# Patient Record
Sex: MALE | Race: WHITE | NOT HISPANIC OR LATINO
[De-identification: names, ages, dates, MRNs, and addresses within clinical notes are randomized per-mention and may not be internally consistent; named-entity substitution may affect disease eponyms.]

---

## 2019-10-22 ENCOUNTER — APPOINTMENT (OUTPATIENT)
Dept: GASTROENTEROLOGY | Facility: CLINIC | Age: 79
End: 2019-10-22
Payer: MEDICARE

## 2019-10-22 VITALS
TEMPERATURE: 97.9 F | HEIGHT: 65 IN | DIASTOLIC BLOOD PRESSURE: 78 MMHG | WEIGHT: 166 LBS | OXYGEN SATURATION: 98 % | BODY MASS INDEX: 27.66 KG/M2 | RESPIRATION RATE: 17 BRPM | SYSTOLIC BLOOD PRESSURE: 122 MMHG | HEART RATE: 83 BPM

## 2019-10-22 DIAGNOSIS — K92.1 MELENA: ICD-10-CM

## 2019-10-22 DIAGNOSIS — Z12.11 ENCOUNTER FOR SCREENING FOR MALIGNANT NEOPLASM OF COLON: ICD-10-CM

## 2019-10-22 PROCEDURE — 99204 OFFICE O/P NEW MOD 45 MIN: CPT

## 2019-10-22 RX ORDER — POLYETHYLENE GLYCOL 3350, SODIUM SULFATE, SODIUM CHLORIDE, POTASSIUM CHLORIDE, ASCORBIC ACID, SODIUM ASCORBATE 7.5-2.691G
100 KIT ORAL
Qty: 1 | Refills: 0 | Status: ACTIVE | COMMUNITY
Start: 2019-10-22 | End: 1900-01-01

## 2019-10-22 NOTE — ASSESSMENT
[FreeTextEntry1] : Impression:\par \par #1 hematochezia-self-limited hematochezia with history suggestive of a distal outlet anorectal etiology likely from hemorrhoids. Evaluate for any alternate etiology including any possibility of a colonic neoplastic process.\par \par #2 sigmoid diverticulosis.\par \par #3 hemorrhoids.\par \par #4 status post open cholecystectomy.\par \par #5 status appendectomy-history of ruptured appendicitis.\par \par General Medical:\par \par -Hypertension.\par \par -Valvular heart disease: Status post AVR. History rheumatic fever.\par \par -History atrial fibrillation.\par \par -Bladder cancer status post cystoscopic excision.\par \par -Psoriasis.

## 2019-10-22 NOTE — HISTORY OF PRESENT ILLNESS
[FreeTextEntry1] : Office consultation on 10/22/19.\par \par The patient is a 79-year-old man evaluated for consultation following a recent episode of self-limited hematochezia.\par \par Reports that approximately 2-3 weeks ago he had 2 episodes of scant hematochezia. He experiences a sense of anal irritation. He passed an otherwise normal formed bowel movement and observed scant bright red blood coating the stool. This happened on 2 occasions and has subsequently resolved.\par \par Patient now has 2-3 formed bowel movements daily without any complaints of diarrhea, constipation or anal pain. Rectal bleeding has not recurred.\par \par Appetite and weight are stable. Denies any complaints of dysphagia, heartburn, nausea, vomiting or abdominal pain.\par \par Colonoscopy was performed on 4/14/14 which was noted for hemorrhoids. Sigmoid diverticulosis was detected. There was mild patchy peridiverticular erythema. The colonoscopy examination was otherwise normal.\par \par The patient previously underwent an open cholecystectomy. He had a ruptured appendicitis at age 15. Patient otherwise reports no past history of digestive illness except as noted. Family history of colon cancer is not reported.

## 2019-10-22 NOTE — PHYSICAL EXAM
[General Appearance - Alert] : alert [General Appearance - Well Nourished] : well nourished [General Appearance - In No Acute Distress] : in no acute distress [General Appearance - Well Developed] : well developed [General Appearance - Well-Appearing] : healthy appearing [Sclera] : the sclera and conjunctiva were normal [Oropharynx] : the oropharynx was normal [Neck Appearance] : the appearance of the neck was normal [Neck Cervical Mass (___cm)] : no neck mass was observed [Respiration, Rhythm And Depth] : normal respiratory rhythm and effort [Exaggerated Use Of Accessory Muscles For Inspiration] : no accessory muscle use [Auscultation Breath Sounds / Voice Sounds] : lungs were clear to auscultation bilaterally [Heart Rate And Rhythm] : heart rate was normal and rhythm regular [Heart Sounds Gallop] : no gallops [Heart Sounds] : normal S1 and S2 [Murmurs] : no murmurs [Heart Sounds Pericardial Friction Rub] : no pericardial rub [Edema] : there was no peripheral edema [Bowel Sounds] : normal bowel sounds [Abdomen Soft] : soft [] : no hepato-splenomegaly [Abdomen Tenderness] : non-tender [Abdomen Mass (___ Cm)] : no abdominal mass palpated [Abdomen Hernia] : no hernia was discovered [Normal Sphincter Tone] : normal sphincter tone [No Rectal Mass] : no rectal mass [Cervical Lymph Nodes Enlarged Posterior Bilaterally] : posterior cervical [Supraclavicular Lymph Nodes Enlarged Bilaterally] : supraclavicular [Cervical Lymph Nodes Enlarged Anterior Bilaterally] : anterior cervical [No CVA Tenderness] : no ~M costovertebral angle tenderness [Abnormal Walk] : normal gait [Nail Clubbing] : no clubbing  or cyanosis of the fingernails [Skin Color & Pigmentation] : normal skin color and pigmentation [Oriented To Time, Place, And Person] : oriented to person, place, and time [Impaired Insight] : insight and judgment were intact [Affect] : the affect was normal [Mood] : the mood was normal [FreeTextEntry1] : Scattered psoriatic plaques-noted on buttocks.

## 2019-12-30 ENCOUNTER — APPOINTMENT (OUTPATIENT)
Dept: GASTROENTEROLOGY | Facility: HOSPITAL | Age: 79
End: 2019-12-30

## 2020-04-09 ENCOUNTER — APPOINTMENT (OUTPATIENT)
Dept: GASTROENTEROLOGY | Facility: HOSPITAL | Age: 80
End: 2020-04-09

## 2020-07-09 DIAGNOSIS — Z01.818 ENCOUNTER FOR OTHER PREPROCEDURAL EXAMINATION: ICD-10-CM

## 2020-07-10 ENCOUNTER — APPOINTMENT (OUTPATIENT)
Dept: DISASTER EMERGENCY | Facility: CLINIC | Age: 80
End: 2020-07-10

## 2020-07-10 LAB — SARS-COV-2 N GENE NPH QL NAA+PROBE: NOT DETECTED

## 2020-07-13 ENCOUNTER — OUTPATIENT (OUTPATIENT)
Dept: OUTPATIENT SERVICES | Facility: HOSPITAL | Age: 80
LOS: 1 days | Discharge: HOME | End: 2020-07-13
Payer: MEDICARE

## 2020-07-13 ENCOUNTER — APPOINTMENT (OUTPATIENT)
Dept: GASTROENTEROLOGY | Facility: HOSPITAL | Age: 80
End: 2020-07-13

## 2020-07-13 ENCOUNTER — RESULT REVIEW (OUTPATIENT)
Age: 80
End: 2020-07-13

## 2020-07-13 VITALS
HEIGHT: 65 IN | DIASTOLIC BLOOD PRESSURE: 77 MMHG | SYSTOLIC BLOOD PRESSURE: 128 MMHG | TEMPERATURE: 98 F | HEART RATE: 73 BPM | OXYGEN SATURATION: 99 % | RESPIRATION RATE: 16 BRPM | WEIGHT: 166.89 LBS

## 2020-07-13 VITALS
RESPIRATION RATE: 18 BRPM | DIASTOLIC BLOOD PRESSURE: 78 MMHG | OXYGEN SATURATION: 98 % | SYSTOLIC BLOOD PRESSURE: 121 MMHG | HEART RATE: 67 BPM

## 2020-07-13 DIAGNOSIS — Z12.11 ENCOUNTER FOR SCREENING FOR MALIGNANT NEOPLASM OF COLON: ICD-10-CM

## 2020-07-13 PROCEDURE — 45380 COLONOSCOPY AND BIOPSY: CPT

## 2020-07-13 PROCEDURE — 88305 TISSUE EXAM BY PATHOLOGIST: CPT | Mod: 26

## 2020-07-13 NOTE — ASU PATIENT PROFILE, ADULT - PMH
Atrial fibrillation, unspecified type    HTN (hypertension)    Rheumatic fever  in childhood  Severe aortic stenosis

## 2020-07-15 LAB — SURGICAL PATHOLOGY STUDY: SIGNIFICANT CHANGE UP

## 2020-12-21 PROBLEM — Z12.11 ENCOUNTER FOR SCREENING COLONOSCOPY: Status: RESOLVED | Noted: 2019-10-22 | Resolved: 2020-12-21

## 2021-09-01 ENCOUNTER — OUTPATIENT (OUTPATIENT)
Dept: OUTPATIENT SERVICES | Facility: HOSPITAL | Age: 81
LOS: 1 days | End: 2021-09-01
Payer: MEDICARE

## 2021-09-10 ENCOUNTER — INPATIENT (INPATIENT)
Facility: HOSPITAL | Age: 81
LOS: 5 days | Discharge: HOME HEALTH | DRG: 862 | End: 2021-09-16
Attending: INTERNAL MEDICINE | Admitting: INTERNAL MEDICINE
Payer: COMMERCIAL

## 2021-09-10 VITALS
SYSTOLIC BLOOD PRESSURE: 110 MMHG | DIASTOLIC BLOOD PRESSURE: 70 MMHG | HEIGHT: 65 IN | OXYGEN SATURATION: 99 % | TEMPERATURE: 98 F | RESPIRATION RATE: 18 BRPM | WEIGHT: 154.98 LBS | HEART RATE: 74 BPM

## 2021-09-10 DIAGNOSIS — I10 ESSENTIAL (PRIMARY) HYPERTENSION: ICD-10-CM

## 2021-09-10 DIAGNOSIS — T14.8XXA OTHER INJURY OF UNSPECIFIED BODY REGION, INITIAL ENCOUNTER: ICD-10-CM

## 2021-09-10 DIAGNOSIS — C68.9 MALIGNANT NEOPLASM OF URINARY ORGAN, UNSPECIFIED: ICD-10-CM

## 2021-09-10 DIAGNOSIS — Z90.79 ACQUIRED ABSENCE OF OTHER GENITAL ORGAN(S): Chronic | ICD-10-CM

## 2021-09-10 DIAGNOSIS — N39.0 URINARY TRACT INFECTION, SITE NOT SPECIFIED: ICD-10-CM

## 2021-09-10 DIAGNOSIS — I48.20 CHRONIC ATRIAL FIBRILLATION, UNSPECIFIED: ICD-10-CM

## 2021-09-10 DIAGNOSIS — T81.19XA OTHER POSTPROCEDURAL SHOCK, INITIAL ENCOUNTER: ICD-10-CM

## 2021-09-10 DIAGNOSIS — Z29.9 ENCOUNTER FOR PROPHYLACTIC MEASURES, UNSPECIFIED: ICD-10-CM

## 2021-09-10 DIAGNOSIS — R18.8 OTHER ASCITES: ICD-10-CM

## 2021-09-10 LAB
ALBUMIN SERPL ELPH-MCNC: 3.4 G/DL — SIGNIFICANT CHANGE UP (ref 3.3–5)
ALP SERPL-CCNC: 109 U/L — SIGNIFICANT CHANGE UP (ref 40–120)
ALT FLD-CCNC: 17 U/L — SIGNIFICANT CHANGE UP (ref 10–45)
ANION GAP SERPL CALC-SCNC: 13 MMOL/L — SIGNIFICANT CHANGE UP (ref 5–17)
APPEARANCE UR: CLEAR — SIGNIFICANT CHANGE UP
APTT BLD: 39.9 SEC — HIGH (ref 27.5–35.5)
AST SERPL-CCNC: 11 U/L — SIGNIFICANT CHANGE UP (ref 10–40)
BACTERIA # UR AUTO: ABNORMAL
BASE EXCESS BLDV CALC-SCNC: 0.9 MMOL/L — SIGNIFICANT CHANGE UP (ref -2–2)
BASOPHILS # BLD AUTO: 0.01 K/UL — SIGNIFICANT CHANGE UP (ref 0–0.2)
BASOPHILS NFR BLD AUTO: 0.1 % — SIGNIFICANT CHANGE UP (ref 0–2)
BILIRUB SERPL-MCNC: 0.4 MG/DL — SIGNIFICANT CHANGE UP (ref 0.2–1.2)
BILIRUB UR-MCNC: NEGATIVE — SIGNIFICANT CHANGE UP
BLD GP AB SCN SERPL QL: NEGATIVE — SIGNIFICANT CHANGE UP
BUN SERPL-MCNC: 24 MG/DL — HIGH (ref 7–23)
CA-I SERPL-SCNC: 1.27 MMOL/L — SIGNIFICANT CHANGE UP (ref 1.15–1.33)
CALCIUM SERPL-MCNC: 9.4 MG/DL — SIGNIFICANT CHANGE UP (ref 8.4–10.5)
CHLORIDE BLDV-SCNC: 101 MMOL/L — SIGNIFICANT CHANGE UP (ref 96–108)
CHLORIDE SERPL-SCNC: 98 MMOL/L — SIGNIFICANT CHANGE UP (ref 96–108)
CO2 BLDV-SCNC: 28 MMOL/L — HIGH (ref 22–26)
CO2 SERPL-SCNC: 24 MMOL/L — SIGNIFICANT CHANGE UP (ref 22–31)
COLOR SPEC: SIGNIFICANT CHANGE UP
CREAT SERPL-MCNC: 0.93 MG/DL — SIGNIFICANT CHANGE UP (ref 0.5–1.3)
DIFF PNL FLD: NEGATIVE — SIGNIFICANT CHANGE UP
EOSINOPHIL # BLD AUTO: 0.08 K/UL — SIGNIFICANT CHANGE UP (ref 0–0.5)
EOSINOPHIL NFR BLD AUTO: 0.6 % — SIGNIFICANT CHANGE UP (ref 0–6)
EPI CELLS # UR: 0 /HPF — SIGNIFICANT CHANGE UP
GAS PNL BLDV: 135 MMOL/L — LOW (ref 136–145)
GAS PNL BLDV: SIGNIFICANT CHANGE UP
GLUCOSE BLDV-MCNC: 137 MG/DL — HIGH (ref 70–99)
GLUCOSE SERPL-MCNC: 136 MG/DL — HIGH (ref 70–99)
GLUCOSE UR QL: NEGATIVE — SIGNIFICANT CHANGE UP
HCO3 BLDV-SCNC: 27 MMOL/L — SIGNIFICANT CHANGE UP (ref 22–29)
HCT VFR BLD CALC: 27.6 % — LOW (ref 39–50)
HCT VFR BLDA CALC: 26 % — LOW (ref 39–51)
HGB BLD CALC-MCNC: 8.7 G/DL — LOW (ref 12.6–17.4)
HGB BLD-MCNC: 8.3 G/DL — LOW (ref 13–17)
HYALINE CASTS # UR AUTO: 6 /LPF — HIGH (ref 0–2)
IMM GRANULOCYTES NFR BLD AUTO: 0.9 % — SIGNIFICANT CHANGE UP (ref 0–1.5)
INR BLD: 2.14 RATIO — HIGH (ref 0.88–1.16)
KETONES UR-MCNC: NEGATIVE — SIGNIFICANT CHANGE UP
LACTATE BLDV-MCNC: 2.2 MMOL/L — HIGH (ref 0.7–2)
LEUKOCYTE ESTERASE UR-ACNC: ABNORMAL
LYMPHOCYTES # BLD AUTO: 1.23 K/UL — SIGNIFICANT CHANGE UP (ref 1–3.3)
LYMPHOCYTES # BLD AUTO: 9.6 % — LOW (ref 13–44)
MCHC RBC-ENTMCNC: 23.7 PG — LOW (ref 27–34)
MCHC RBC-ENTMCNC: 30.1 GM/DL — LOW (ref 32–36)
MCV RBC AUTO: 78.9 FL — LOW (ref 80–100)
MONOCYTES # BLD AUTO: 0.86 K/UL — SIGNIFICANT CHANGE UP (ref 0–0.9)
MONOCYTES NFR BLD AUTO: 6.7 % — SIGNIFICANT CHANGE UP (ref 2–14)
NEUTROPHILS # BLD AUTO: 10.51 K/UL — HIGH (ref 1.8–7.4)
NEUTROPHILS NFR BLD AUTO: 82.1 % — HIGH (ref 43–77)
NITRITE UR-MCNC: POSITIVE
NRBC # BLD: 0 /100 WBCS — SIGNIFICANT CHANGE UP (ref 0–0)
PCO2 BLDV: 47 MMHG — SIGNIFICANT CHANGE UP (ref 42–55)
PH BLDV: 7.36 — SIGNIFICANT CHANGE UP (ref 7.32–7.43)
PH UR: 6.5 — SIGNIFICANT CHANGE UP (ref 5–8)
PLATELET # BLD AUTO: 320 K/UL — SIGNIFICANT CHANGE UP (ref 150–400)
PO2 BLDV: 23 MMHG — LOW (ref 25–45)
POTASSIUM BLDV-SCNC: 3.6 MMOL/L — SIGNIFICANT CHANGE UP (ref 3.5–5.1)
POTASSIUM SERPL-MCNC: 3.4 MMOL/L — LOW (ref 3.5–5.3)
POTASSIUM SERPL-SCNC: 3.4 MMOL/L — LOW (ref 3.5–5.3)
PROT SERPL-MCNC: 6.8 G/DL — SIGNIFICANT CHANGE UP (ref 6–8.3)
PROT UR-MCNC: SIGNIFICANT CHANGE UP
PROTHROM AB SERPL-ACNC: 24.8 SEC — HIGH (ref 10.6–13.6)
RAPID RVP RESULT: SIGNIFICANT CHANGE UP
RBC # BLD: 3.5 M/UL — LOW (ref 4.2–5.8)
RBC # FLD: 19.3 % — HIGH (ref 10.3–14.5)
RBC CASTS # UR COMP ASSIST: 3 /HPF — SIGNIFICANT CHANGE UP (ref 0–4)
RH IG SCN BLD-IMP: POSITIVE — SIGNIFICANT CHANGE UP
SAO2 % BLDV: 19.8 % — LOW (ref 67–88)
SARS-COV-2 RNA SPEC QL NAA+PROBE: SIGNIFICANT CHANGE UP
SODIUM SERPL-SCNC: 135 MMOL/L — SIGNIFICANT CHANGE UP (ref 135–145)
SP GR SPEC: 1.01 — SIGNIFICANT CHANGE UP (ref 1.01–1.02)
UROBILINOGEN FLD QL: NEGATIVE — SIGNIFICANT CHANGE UP
WBC # BLD: 12.8 K/UL — HIGH (ref 3.8–10.5)
WBC # FLD AUTO: 12.8 K/UL — HIGH (ref 3.8–10.5)
WBC UR QL: 57 /HPF — HIGH (ref 0–5)

## 2021-09-10 PROCEDURE — 74177 CT ABD & PELVIS W/CONTRAST: CPT | Mod: 26,MA

## 2021-09-10 PROCEDURE — 99223 1ST HOSP IP/OBS HIGH 75: CPT

## 2021-09-10 PROCEDURE — 99285 EMERGENCY DEPT VISIT HI MDM: CPT

## 2021-09-10 RX ORDER — DILTIAZEM HCL 120 MG
30 CAPSULE, EXT RELEASE 24 HR ORAL THREE TIMES A DAY
Refills: 0 | Status: DISCONTINUED | OUTPATIENT
Start: 2021-09-10 | End: 2021-09-16

## 2021-09-10 RX ORDER — PIPERACILLIN AND TAZOBACTAM 4; .5 G/20ML; G/20ML
3.38 INJECTION, POWDER, LYOPHILIZED, FOR SOLUTION INTRAVENOUS ONCE
Refills: 0 | Status: COMPLETED | OUTPATIENT
Start: 2021-09-10 | End: 2021-09-10

## 2021-09-10 RX ORDER — LANOLIN ALCOHOL/MO/W.PET/CERES
3 CREAM (GRAM) TOPICAL AT BEDTIME
Refills: 0 | Status: DISCONTINUED | OUTPATIENT
Start: 2021-09-10 | End: 2021-09-16

## 2021-09-10 RX ORDER — METOPROLOL TARTRATE 50 MG
100 TABLET ORAL DAILY
Refills: 0 | Status: DISCONTINUED | OUTPATIENT
Start: 2021-09-10 | End: 2021-09-16

## 2021-09-10 RX ORDER — PIPERACILLIN AND TAZOBACTAM 4; .5 G/20ML; G/20ML
3.38 INJECTION, POWDER, LYOPHILIZED, FOR SOLUTION INTRAVENOUS EVERY 8 HOURS
Refills: 0 | Status: DISCONTINUED | OUTPATIENT
Start: 2021-09-10 | End: 2021-09-16

## 2021-09-10 RX ORDER — VANCOMYCIN HCL 1 G
1000 VIAL (EA) INTRAVENOUS ONCE
Refills: 0 | Status: COMPLETED | OUTPATIENT
Start: 2021-09-10 | End: 2021-09-10

## 2021-09-10 RX ORDER — ACETAMINOPHEN 500 MG
650 TABLET ORAL EVERY 6 HOURS
Refills: 0 | Status: DISCONTINUED | OUTPATIENT
Start: 2021-09-10 | End: 2021-09-16

## 2021-09-10 RX ADMIN — PIPERACILLIN AND TAZOBACTAM 3.38 GRAM(S): 4; .5 INJECTION, POWDER, LYOPHILIZED, FOR SOLUTION INTRAVENOUS at 18:18

## 2021-09-10 RX ADMIN — Medication 250 MILLIGRAM(S): at 18:18

## 2021-09-10 RX ADMIN — PIPERACILLIN AND TAZOBACTAM 200 GRAM(S): 4; .5 INJECTION, POWDER, LYOPHILIZED, FOR SOLUTION INTRAVENOUS at 17:33

## 2021-09-10 NOTE — H&P ADULT - ASSESSMENT
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 History was provided by the patient and his son, Curry at bedside. The patient reports he had been recovering from his surgery, but still felt fatigued. He noticed drainage of pus, redness and swelling at the pelvic incision site and reported to his doctor earlier today at [facility] during his scheduled follow-up visit, and was thus sent to the ED.  The patient denies fever, chills, nausea, vomiting. He gets constipation. Urine is clear yellow and nonbloody. He notes some odor when urine accumulates. He has not had dizziness or lightheadedness.  Of note, patient endorses having mild chronic SOB since his immunotherapy treatment. He has chronic pedal edema since the surgery which has been improving. He had US to rule out DVT.   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

## 2021-09-10 NOTE — ED ADULT NURSE NOTE - NSIMPLEMENTINTERV_GEN_ALL_ED
Implemented All Fall with Harm Risk Interventions:  Bear to call system. Call bell, personal items and telephone within reach. Instruct patient to call for assistance. Room bathroom lighting operational. Non-slip footwear when patient is off stretcher. Physically safe environment: no spills, clutter or unnecessary equipment. Stretcher in lowest position, wheels locked, appropriate side rails in place. Provide visual cue, wrist band, yellow gown, etc. Monitor gait and stability. Monitor for mental status changes and reorient to person, place, and time. Review medications for side effects contributing to fall risk. Reinforce activity limits and safety measures with patient and family. Provide visual clues: red socks.

## 2021-09-10 NOTE — CONSULT NOTE ADULT - ASSESSMENT
Hematology/Oncology consulted on this 80 year old gentleman with history of high grade muscle invasive urothelial carcinoma, on IRB 18-42 protocol at [facility]. Patient received Nivo/ipi 5/25 and nivolumab on 6/15/21 per protocol. Immunotherapy was discontinued on IRB due to grade 3 autoimmune hepatitis. He is cisplatin ineligible due to hearing loss. He had a prednisone taper and is now off steroids. His repeat C/A/P with contrast on 7/8/21 showed mild right hydronephrosis. He is S/P robotic cystectomy, ( last month) prostatectomy with enterostomy by Dr Swann on 7/30/21. He had an appointment today with [facility] for toxicity check and hydration. He did not receive IV hydration today and was referred for evaluation of a infected suprapubic incision left side, with purulent drainage. He denies pain and fever.  He is currently undergoing treatment at [facility] with Dr Smith.   Of note, history of AVR/afib on xarelto and HTN.      High grade urothelial bladder cancer  --Undergoing treatment at [facility]  --will follow up with Dr Smith at [facility] after discharge    Infected wound  --WBC 12.8, Afebrile  --CTAP  pending to rule out abscess  --blood and tissue cultures pending        Anemia  --H&H 8.3/27.6, likely related to underlying malignancy  --will trend curve    UTI  --many bacteria. large leukocytes  --Culture pending  --zosyn/vanco given in ED    Bilateral lower extremity edema  --Can diurese when labs result  --per primary      Patient will be seen daily while admitted and we will continue to coordinate with [facility]    Sobia Beyer NP  Hematology/Oncology  [facility]  [phone] (Cell)  [phone] (Office)  [phone] (Alt office)  Evenings and weekends please call MD on call or office         Hematology/Oncology consulted on this 80 year old gentleman with history of high grade muscle invasive urothelial carcinoma, on IRB 18-42 protocol at [facility]. Patient received Nivo/ipi 5/25 and nivolumab on 6/15/21 per protocol. Immunotherapy was discontinued on IRB due to grade 3 autoimmune hepatitis. He is cisplatin ineligible due to hearing loss. He had a prednisone taper and is now off steroids. His repeat C/A/P with contrast on 7/8/21 showed mild right hydronephrosis. He is S/P robotic cystectomy, ( last month) prostatectomy with enterostomy by Dr Swann on 7/30/21. He had an appointment today with [facility] for toxicity check and hydration. He did not receive IV hydration today and was referred for evaluation of a infected suprapubic incision left side, with purulent drainage. He denies pain and fever.  He is currently undergoing treatment at [facility] with Dr Smith.   Of note, history of AVR/afib on xarelto and HTN.      High grade urothelial bladder cancer  --Undergoing treatment at [facility]  --will follow up with Dr Smith at [facility] after discharge    Infected wound  --WBC 12.8, Afebrile  --CTAP  pending to rule out abscess  --blood and tissue cultures pending        Anemia  --H&H 8.3/27.6, likely related to underlying malignancy  --will trend curve  --please transfuse for hgb <7.0    UTI  --many bacteria. large leukocytes  --Culture pending  --zosyn/vanco given in ED    Bilateral lower extremity edema  --Can diurese when labs result  --per primary      Patient will be seen daily while admitted and we will continue to coordinate with [facility]    Sobia Beyer NP  Hematology/Oncology  [facility]  [phone] (Cell)  [phone] (Office)  [phone] (Alt office)  Evenings and weekends please call MD on call or office

## 2021-09-10 NOTE — H&P ADULT - NSICDXPASTSURGICALHX_GEN_ALL_CORE_FT
PAST SURGICAL HISTORY:  History of appendectomy     History of cholecystectomy     History of nasal septoplasty     S/P prostatectomy

## 2021-09-10 NOTE — H&P ADULT - NSHPSOCIALHISTORY_GEN_ALL_CORE
The patient denies tobacco use, alcohol use or drug use.  He is . The patient denies tobacco use, alcohol use or drug use.  He is  and lives with his 2 sons.

## 2021-09-10 NOTE — ED PROVIDER NOTE - NSICDXPASTMEDICALHX_GEN_ALL_CORE_FT
PAST MEDICAL HISTORY:  Atrial fibrillation, unspecified type     HTN (hypertension)     Rheumatic fever in childhood    Severe aortic stenosis

## 2021-09-10 NOTE — ED ADULT NURSE NOTE - CHIEF COMPLAINT QUOTE
sent from [facility] for eval of abdominal wound infection and pulmonary edema. pt offers no complaints.

## 2021-09-10 NOTE — H&P ADULT - PROBLEM SELECTOR PLAN 1
Pt has mild purulence, erythema and swelling at pelvic incision site.  Will consult surgical team.   Check MRSA swab. Patient was given vancomycin and zosyn in the ED.  Will continue zosyn for now (also covers UTI). Will continue vancomycin if MRSA positive.   Follow up urine and blood cultures.  Monitor VS q4h. Pt has mild purulence, erythema and swelling at pelvic incision site.  Will consult urology team.   Check MRSA swab. Patient was given vancomycin and zosyn in the ED.  Will continue zosyn for now (also covers UTI). Will continue vancomycin if MRSA positive.   Follow up urine and blood cultures.  Monitor VS q4h.

## 2021-09-10 NOTE — H&P ADULT - NSICDXPASTMEDICALHX_GEN_ALL_CORE_FT
PAST MEDICAL HISTORY:  Atrial fibrillation, unspecified type     HTN (hypertension)     Rheumatic fever in childhood    Severe aortic stenosis     Urothelial cancer

## 2021-09-10 NOTE — ED PROVIDER NOTE - OBJECTIVE STATEMENT
Albaro Christian): 82 y/o M w/ PMH Atrial fibrillation, HTN, rheumatic fever in childhood, severe aortic stenosis, h/o appendectomy, h/l cholecystectomy and h/o nasal septoplasty, coming in from outpatient clinic for concerns of infected wound scar. Pt states he has noted leakage in abdominal wound for 2-3 days now. Denies any pain, fever, chills, nausea or vomiting. Pt has been in normal state of health otherwise.

## 2021-09-10 NOTE — H&P ADULT - NSHPADDITIONALINFOADULT_GEN_ALL_CORE
Patient assigned to me by night hospitalist in charge for management and care for patient for this evening only. Care to be continued by day hospitalist in the morning and thereafter.  Loly Trujillo MD [phone]

## 2021-09-10 NOTE — ED PROVIDER NOTE - PROGRESS NOTE DETAILS
Alvina Morrison (DO) MDM: 80 y/o M w/ PMH bladder CA w/ a cystectomy and ileal conduit July 30, coming in today from doctors office for concern of surgical site infection. His suprapubic incision site is erythematous and indurated w/ some weeping fluid and his ileal conduit site is also erythematous w/ purulent crusting and clear urine. Denies fever, nausea, vomiting or abdominal pain. Will send sepsis labs, CT abd/pelvis to evaluate for collection abscess, culture of incision site sent as well, empiric antibiotics and admit. Alvina Morrison (DO) MDM: 80 y/o M w/ PMH bladder CA w/ a cystectomy and ileal conduit July 30, coming in today from doctors office for concern of surgical site infection. His suprapubic incision site is erythematous and indurated w/ some weeping fluid and his ileal conduit site is also erythematous w/ purulent crusting and clear, non cloudy urine. Denies fever, nausea, vomiting or abdominal pain. Will send sepsis labs, CT abd/pelvis to evaluate for collection abscess, culture of incision site sent as well, empiric antibiotics and admit.

## 2021-09-10 NOTE — H&P ADULT - HISTORY OF PRESENT ILLNESS
This patient is a 80yo gentleman with PMH of atrial fibrillation, htn, rheumatic fever in childhood, severe AS, high grade muscle invasive urothelial cancer s/p immunotherapy c/b  autoimmune hepatitis, s/p robotic cystectomy, prostatectomy with enterostomy who presents to the ED from [facility] for wound infection. 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 History was provided by the patient and his son, Curry at bedside. The patient reports he had been recovering from his surgery, but still felt fatigued. He noticed drainage of pus, redness and swelling at the pelvic incision site and reported to his doctor earlier today at [facility] during his scheduled follow-up visit, and was thus sent to the ED.  The patient denies fever, chills, nausea, vomiting. He gets constipation. Urine is clear yellow and nonbloody. He notes some odor when urine accumulates. He has not had dizziness or lightheadedness.  Of note, patient endorses having mild chronic SOB since his immunotherapy treatment. He has chronic pedal edema since the surgery which has been improving. He had US to rule out DVT.

## 2021-09-10 NOTE — H&P ADULT - NSHPREVIEWOFSYSTEMS_GEN_ALL_CORE
REVIEW OF SYSTEMS  CONSTITUTIONAL: No fever, no chills, no fatigue  EYES: No eye pain, no vision changes  ENMT:  No difficulty hearing, no throat pain  RESPIRATORY: No cough, no wheezing, no sputum production; No shortness of breath  CARDIOVASCULAR: No chest pain, no palpitations, no MONTOYA, no leg swelling  GASTROINTESTINAL: No abdominal pain, no nausea, no vomiting, no hematemesis, no diarrhea, no constipation, no melena, no hematochezia.  GENITOURINARY: No dysuria, no hematuria  NEUROLOGICAL: No headaches, no loss of strength, no numbness  SKIN: No itching, no rashes, no lesions   MUSCULOSKELETAL: No joint pain, no joint swelling; No muscle pain  HEME/LYMPH: No easy bruising, bleeding REVIEW OF SYSTEMS  CONSTITUTIONAL: No fever, no chills, + fatigue  EYES: No eye pain, no vision changes  ENMT:  + difficulty hearing, no throat pain  RESPIRATORY: No cough, no wheezing, no sputum production; + mild chronic shortness of breath  CARDIOVASCULAR: No chest pain, no palpitations, + leg swelling  GASTROINTESTINAL: no nausea, no vomiting, no diarrhea, + constipation, occasional blood noted when wiping after straining  GENITOURINARY: no hematuria, + urine odor  NEUROLOGICAL: No headaches, no loss of strength, no numbness  SKIN: No itching, no rashes, no lesions   MUSCULOSKELETAL: No joint pain, no joint swelling; No muscle pain  HEME/LYMPH: No easy bruising, no bleeding

## 2021-09-10 NOTE — H&P ADULT - PROBLEM SELECTOR PLAN 5
Patient denies CP or palpitations at this time.   Cardiology progress note from [facility] indicates that the patient has undergone cardioversion x 3 in the past, then reverted back to atrial fibrillation.   He has a CHADSVASC score of 3 based on age and htn.   Maintain  K4, Mg 2 Patient denies CP or palpitations at this time.   Cardiology progress note from [facility] indicates that the patient has undergone cardioversion x 3 in the past, then reverted back to atrial fibrillation.   He has a CHADSVASC score of 3 based on age and htn.   Maintain  K4, Mg 2  Hold for now xarelto pending IR evaluation.  C/w metoprolol and diltiazem.

## 2021-09-10 NOTE — ED CLERICAL - NS ED CLERK NOTE PRE-ARRIVAL INFORMATION; ADDITIONAL PRE-ARRIVAL INFORMATION
CC/Reason For referral: [facility] bladder ca last tx June 2021 - 7/20/21 s/p ileal conduit - suprapubic site oozing urine, otherwise unremarkable r/o obstruction - c/o sob and LE 3+ pitting edema, on Hydralazine   Preferred Consultant(if applicable):  Who admits for you (if needed):  Do you have documents you would like to fax over? NO  Would you still like to speak to an ED attending? NO

## 2021-09-10 NOTE — ED PROVIDER NOTE - NSICDXPASTSURGICALHX_GEN_ALL_CORE_FT
PAST SURGICAL HISTORY:  History of appendectomy     History of cholecystectomy     History of nasal septoplasty

## 2021-09-10 NOTE — ED ADULT NURSE NOTE - OBJECTIVE STATEMENT
81yr old male w/ pmhx of Afib, HTN, Rheumatic fever, and aortic stenosis presents to ED from [facility] c/o possible wound infection, and LE edema. Pt s/p bladder and prostate w/ ostomy removal july 30th, he went to [facility] for check up and was sent to ED because abdominal incision site appears to be infected. Pt also states he is experiencing increasing edema in the lower extremities. Pt states his LE are usually edematous, but it has gotten progressively worse. Pt states he is asymptomatic, he denies fevers, chills, NVD, Flank pain, CP, GI symptoms. Some redness noted around incision site, pt denies any puss or foul odor of site. Pt accompanied by son, placed on CM, bed lowered and locked, call bell within reach will reassess

## 2021-09-10 NOTE — H&P ADULT - PROBLEM SELECTOR PLAN 7
VTE ppx:  hold xarelto pending IR evaluation  Activity: OOB with assistance, fall precautions  Diet: NPO after midnight

## 2021-09-10 NOTE — H&P ADULT - NSHPPHYSICALEXAM_GEN_ALL_CORE
T(C): 36.6 (09-10-21 @ 16:45), Max: 36.6 (09-10-21 @ 15:47)  HR: 74 (09-10-21 @ 20:44) (66 - 74)  BP: 113/64 (09-10-21 @ 20:44) (110/70 - 116/57)  RR: 26 (09-10-21 @ 20:44) (18 - 30)  SpO2: 100% (09-10-21 @ 20:44) (99% - 100%)  Wt(kg): --    PHYSICAL EXAM:  GENERAL: NAD, well-groomed, well-developed  HEAD:  Atraumatic, Normocephalic  EYES: EOMI, PERRLA, conjunctiva and sclera clear  ENMT: No oropharyngeal exudates, erythema or lesions,  Moist mucous membranes, good dentition  NECK: Supple, no cervical lymphadenopathy, no JVD  NERVOUS SYSTEM:  Alert & Oriented X3, CN II-XII intact, 5/5 BUE and BLE motor strength, full sensation to light touch   CHEST/LUNG: Clear to auscultation bilaterally; No rales, no  rhonchi, no wheezing  HEART: Regular rate and rhythm; No murmurs, rubs, or gallops  ABDOMEN: Soft, Nontender, Nondistended  EXTREMITIES:  2+ radial Pulses, No cyanosis or edema  SKIN: warm, dry T(C): 36.6 (09-10-21 @ 16:45), Max: 36.6 (09-10-21 @ 15:47)  HR: 74 (09-10-21 @ 20:44) (66 - 74)  BP: 113/64 (09-10-21 @ 20:44) (110/70 - 116/57)  RR: 26 (09-10-21 @ 20:44) (18 - 30)  SpO2: 100% (09-10-21 @ 20:44) (99% - 100%)  Wt(kg): --    PHYSICAL EXAM:  GENERAL: NAD, well-developed  HEAD:  Atraumatic, Normocephalic  EYES: EOMI, PERRLA, conjunctiva and sclera clear  ENMT: hard of hearing, No oropharyngeal exudates, Moist mucous membranes  NECK: Supple, no cervical lymphadenopathy  NERVOUS SYSTEM:  Alert & Oriented X3, conversant, ambulatory, CN II-XII intact, 5/5 BUE motor strength, full sensation to light touch   CHEST/LUNG: Clear to auscultation bilaterally; No rales, no  rhonchi, no wheezing  HEART: Regular rate and rhythm; No murmurs, rubs, or gallops  3+ pitting edema bilateral lower extremity to knees   ABDOMEN: Soft, Nontender, Nondistended  : ileal conduit draining clear yellow urine with white sediment into bag  no surrounding tenderness to exam  EXTREMITIES:  2+ radial Pulses, No cyanosis  SKIN: erythema, warmth and minimal purulent drainage from horizontal pelvic incision site, no blood

## 2021-09-10 NOTE — H&P ADULT - PROBLEM SELECTOR PLAN 2
UA is grossly positive.  Will continue zosyn for now.  Follow up urine and blood cultures.  Monitor VS q4h.  Tylenol PRN for fever.

## 2021-09-10 NOTE — H&P ADULT - NSHPLABSRESULTS_GEN_ALL_CORE
Labs, personally reviewed by me.     CBC found leukocytosis of 12.8 with neutrophilia.  Patient has microcytic anemia with Hgb of 8.3.  INR elevated at 2.14.  Mild hypokalemia K 3.4.  Lactate 2.2  UA grossly positive for bacteria, LE, nitrite.  COVIDRVP negative.    LABS:                        8.3    12.80 )-----------( 320      ( 10 Sep 2021 17:16 )             27.6     Hgb Trend: 8.3<--  09-10    135  |  98  |  24<H>  ----------------------------<  136<H>  3.4<L>   |  24  |  0.93    Ca    9.4      10 Sep 2021 17:16    TPro  6.8  /  Alb  3.4  /  TBili  0.4  /  DBili  x   /  AST  11  /  ALT  17  /  AlkPhos  109  09-10    Creatinine Trend: 0.93<--  PT/INR - ( 10 Sep 2021 17:16 )   PT: 24.8 sec;   INR: 2.14 ratio         PTT - ( 10 Sep 2021 17:16 )  PTT:39.9 sec  Urinalysis Basic - ( 10 Sep 2021 17:16 )    Color: Light Yellow / Appearance: Clear / S.010 / pH: x  Gluc: x / Ketone: Negative  / Bili: Negative / Urobili: Negative   Blood: x / Protein: Trace / Nitrite: Positive   Leuk Esterase: Large / RBC: 3 /hpf / WBC 57 /HPF   Sq Epi: x / Non Sq Epi: 0 /hpf / Bacteria: Many        Venous Blood Gas:  09-10 @ 17:16  7.36/47/23/27/19.8  VBG Lactate: 2.2 Labs, personally reviewed by me.     CBC found leukocytosis of 12.8 with neutrophilia.  Patient has microcytic anemia with Hgb of 8.3.  INR elevated at 2.14.  Mild hypokalemia K 3.4.  Lactate 2.2  UA grossly positive for bacteria, LE, nitrite.  COVID RVP negative.    LABS:                        8.3    12.80 )-----------( 320      ( 10 Sep 2021 17:16 )             27.6     Hgb Trend: 8.3<--  09-10    135  |  98  |  24<H>  ----------------------------<  136<H>  3.4<L>   |  24  |  0.93    Ca    9.4      10 Sep 2021 17:16    TPro  6.8  /  Alb  3.4  /  TBili  0.4  /  DBili  x   /  AST  11  /  ALT  17  /  AlkPhos  109  09-10    Creatinine Trend: 0.93<--  PT/INR - ( 10 Sep 2021 17:16 )   PT: 24.8 sec;   INR: 2.14 ratio         PTT - ( 10 Sep 2021 17:16 )  PTT:39.9 sec  Urinalysis Basic - ( 10 Sep 2021 17:16 )    Color: Light Yellow / Appearance: Clear / S.010 / pH: x  Gluc: x / Ketone: Negative  / Bili: Negative / Urobili: Negative   Blood: x / Protein: Trace / Nitrite: Positive   Leuk Esterase: Large / RBC: 3 /hpf / WBC 57 /HPF   Sq Epi: x / Non Sq Epi: 0 /hpf / Bacteria: Many    Venous Blood Gas:  09-10 @ 17:16              7.36/47/23/27/19.8          VBG Lactate: 2.2    < from: CT Abdomen and Pelvis w/ IV Cont (09.10.21 @ 19:10) >    FINDINGS:  LOWER CHEST: Cardiomegaly. Clear lungs.    LIVER: 3.7 cm right hepatic lobe cyst and additional scattered hypodensities too small characterize.  BILE DUCTS: Normal caliber.  GALLBLADDER: Cholecystectomy.  SPLEEN: Within normal limits.  PANCREAS: Within normal limits.  ADRENALS: Within normal limits.  KIDNEYS/URETERS: Symmetric enhancement. Mild bilateral hydroureteronephrosis. The ureters terminate in an ileal conduit.    BLADDER: Cystectomy.  REPRODUCTIVE ORGANS: Prostate within normal limits.    BOWEL: No bowel obstruction. Appendectomy.  PERITONEUM: Curvilinear rim-enhancing fluid collection along the right psoas and layering in the lower mid pelvic surgical bed. Measures 13.1 cm in the craniocaudal dimension. 7.6cm transverse, and 9.0 cm AP. Extensive inflammatory change at the anterior most aspect of the collection, seemingly adherent to the anterior abdominal wall. A surgical drain adjacent to the ileal conduit terminates in the right hemipelvis just medial to but not within the collection.  VESSELS: Atherosclerotic changes.  RETROPERITONEUM/LYMPH NODES: No lymphadenopathy.  ABDOMINAL WALL: Right lower quadrant ileal conduit. Extensive inflammatory changes in the lower anterior abdominal wall.  BONES: Degenerative changes of the spine. L2 vertebral body hemangioma.    IMPRESSION:  Status post cystectomy and ileal conduit. Curvilinear pelvic fluid collection extending from the cystectomy bed and along the pelvic sidewalls.  There is mild bilateral hydroureteronephrosis.    < end of copied text >    CT A/P results report 2021 from [facility] reviewed-   mildly dilated small bowel without transition point, probable ileus  interval cystoprostatectomy and ileal conduit creation with moderate subcutaneous emphysema- prominent given surgery one week prior  new small bilateral plerual effusions

## 2021-09-10 NOTE — H&P ADULT - PROBLEM SELECTOR PLAN 3
CT revealed a 13.1x 7.6 cm x 9cm curvilinear pelvic fluid collection extending from the cystectomy bed to the pelvic side walls, with extensive inflammatory change at the anterior most aspect of the collection, seemingly adherent to the anterior abdominal wall. Surgical drain is adjacent but not within the collection.   This collection was not noted on his CT report from [facility] from 88/7/2021.  Patient does not have tenderness to palpation of abdomen, nausea, vomiting, fever at this time.  Will consult surgical team, IR team to determine best plan of care for this collection. CT revealed a 13.1x 7.6 cm x 9cm curvilinear pelvic fluid collection extending from the cystectomy bed to the pelvic side walls, with extensive inflammatory change at the anterior most aspect of the collection, seemingly adherent to the anterior abdominal wall. Surgical drain is adjacent but not within the collection.   This collection was not noted on his CT report from [facility] from 88/7/2021.  Patient does not have tenderness to palpation of abdomen, nausea, vomiting, fever at this time.  Will consult urology team and IR team to determine best plan of care for this collection.

## 2021-09-10 NOTE — H&P ADULT - PROBLEM SELECTOR PLAN 4
Follow up oncology team recommendations.  Patient to continue his care with Dr. Smith at [facility].     Anemia- Hgb remains stable from labs from 9/3/2021 from [facility].  Will monitor CBC.

## 2021-09-10 NOTE — ED ADULT TRIAGE NOTE - CHIEF COMPLAINT QUOTE
sent from [facility] for eval of abdominal wound infection and pulmonary edema. pt offers no complaints.

## 2021-09-11 LAB
ANION GAP SERPL CALC-SCNC: 12 MMOL/L — SIGNIFICANT CHANGE UP (ref 5–17)
APTT BLD: 34.4 SEC — SIGNIFICANT CHANGE UP (ref 27.5–35.5)
BUN SERPL-MCNC: 22 MG/DL — SIGNIFICANT CHANGE UP (ref 7–23)
CALCIUM SERPL-MCNC: 9.1 MG/DL — SIGNIFICANT CHANGE UP (ref 8.4–10.5)
CHLORIDE SERPL-SCNC: 101 MMOL/L — SIGNIFICANT CHANGE UP (ref 96–108)
CO2 SERPL-SCNC: 23 MMOL/L — SIGNIFICANT CHANGE UP (ref 22–31)
CREAT SERPL-MCNC: 0.97 MG/DL — SIGNIFICANT CHANGE UP (ref 0.5–1.3)
GLUCOSE SERPL-MCNC: 104 MG/DL — HIGH (ref 70–99)
HCT VFR BLD CALC: 25.5 % — LOW (ref 39–50)
HGB BLD-MCNC: 7.8 G/DL — LOW (ref 13–17)
INR BLD: 1.75 RATIO — HIGH (ref 0.88–1.16)
MCHC RBC-ENTMCNC: 23.7 PG — LOW (ref 27–34)
MCHC RBC-ENTMCNC: 30.6 GM/DL — LOW (ref 32–36)
MCV RBC AUTO: 77.5 FL — LOW (ref 80–100)
MRSA PCR RESULT.: SIGNIFICANT CHANGE UP
NRBC # BLD: 0 /100 WBCS — SIGNIFICANT CHANGE UP (ref 0–0)
PLATELET # BLD AUTO: 272 K/UL — SIGNIFICANT CHANGE UP (ref 150–400)
POTASSIUM SERPL-MCNC: 4.3 MMOL/L — SIGNIFICANT CHANGE UP (ref 3.5–5.3)
POTASSIUM SERPL-SCNC: 4.3 MMOL/L — SIGNIFICANT CHANGE UP (ref 3.5–5.3)
PROTHROM AB SERPL-ACNC: 20.5 SEC — HIGH (ref 10.6–13.6)
RBC # BLD: 3.29 M/UL — LOW (ref 4.2–5.8)
RBC # FLD: 19.1 % — HIGH (ref 10.3–14.5)
S AUREUS DNA NOSE QL NAA+PROBE: SIGNIFICANT CHANGE UP
SODIUM SERPL-SCNC: 136 MMOL/L — SIGNIFICANT CHANGE UP (ref 135–145)
WBC # BLD: 8.96 K/UL — SIGNIFICANT CHANGE UP (ref 3.8–10.5)
WBC # FLD AUTO: 8.96 K/UL — SIGNIFICANT CHANGE UP (ref 3.8–10.5)

## 2021-09-11 PROCEDURE — 99451 NTRPROF PH1/NTRNET/EHR 5/>: CPT

## 2021-09-11 RX ORDER — PHENYLEPHRINE-SHARK LIVER OIL-MINERAL OIL-PETROLATUM RECTAL OINTMENT
1 OINTMENT (GRAM) RECTAL
Refills: 0 | Status: DISCONTINUED | OUTPATIENT
Start: 2021-09-11 | End: 2021-09-16

## 2021-09-11 RX ORDER — INFLUENZA VIRUS VACCINE 15; 15; 15; 15 UG/.5ML; UG/.5ML; UG/.5ML; UG/.5ML
0.5 SUSPENSION INTRAMUSCULAR ONCE
Refills: 0 | Status: DISCONTINUED | OUTPATIENT
Start: 2021-09-11 | End: 2021-09-16

## 2021-09-11 RX ORDER — HEPARIN SODIUM 5000 [USP'U]/ML
5000 INJECTION INTRAVENOUS; SUBCUTANEOUS EVERY 8 HOURS
Refills: 0 | Status: DISCONTINUED | OUTPATIENT
Start: 2021-09-11 | End: 2021-09-13

## 2021-09-11 RX ADMIN — PIPERACILLIN AND TAZOBACTAM 25 GRAM(S): 4; .5 INJECTION, POWDER, LYOPHILIZED, FOR SOLUTION INTRAVENOUS at 17:55

## 2021-09-11 RX ADMIN — PHENYLEPHRINE-SHARK LIVER OIL-MINERAL OIL-PETROLATUM RECTAL OINTMENT 1 APPLICATION(S): at 06:30

## 2021-09-11 RX ADMIN — PIPERACILLIN AND TAZOBACTAM 25 GRAM(S): 4; .5 INJECTION, POWDER, LYOPHILIZED, FOR SOLUTION INTRAVENOUS at 00:52

## 2021-09-11 RX ADMIN — PIPERACILLIN AND TAZOBACTAM 25 GRAM(S): 4; .5 INJECTION, POWDER, LYOPHILIZED, FOR SOLUTION INTRAVENOUS at 09:43

## 2021-09-11 RX ADMIN — Medication 30 MILLIGRAM(S): at 22:04

## 2021-09-11 RX ADMIN — Medication 100 MILLIGRAM(S): at 06:04

## 2021-09-11 RX ADMIN — Medication 30 MILLIGRAM(S): at 06:04

## 2021-09-11 RX ADMIN — HEPARIN SODIUM 5000 UNIT(S): 5000 INJECTION INTRAVENOUS; SUBCUTANEOUS at 22:04

## 2021-09-11 NOTE — PROGRESS NOTE ADULT - SUBJECTIVE AND OBJECTIVE BOX
Name of Patient : RYAN FLOYD  MRN: [id number]  Date of visit: 21      Subjective: Patient seen and examined. No new events except as noted.     REVIEW OF SYSTEMS:    CONSTITUTIONAL: No weakness, fevers or chills  EYES/ENT: No visual changes;  No vertigo or throat pain   NECK: No pain or stiffness  RESPIRATORY: No cough, wheezing, hemoptysis; No shortness of breath  CARDIOVASCULAR: No chest pain or palpitations  GASTROINTESTINAL: No abdominal or epigastric pain.   GENITOURINARY: No dysuria, frequency or hematuria  NEUROLOGICAL: No numbness or weakness  SKIN: No itching, burning, rashes, or lesions   All other review of systems is negative unless indicated above.    MEDICATIONS:  MEDICATIONS  (STANDING):  diltiazem    Tablet 30 milliGRAM(s) Oral three times a day  hemorrhoidal Ointment 1 Application(s) Rectal two times a day  influenza   Vaccine 0.5 milliLiter(s) IntraMuscular once  metoprolol succinate  milliGRAM(s) Oral daily  piperacillin/tazobactam IVPB.. 3.375 Gram(s) IV Intermittent every 8 hours      PHYSICAL EXAM:  T(C): 36.6 (21 @ 18:06), Max: 37 (09-10-21 @ 23:31)  HR: 74 (21 @ 18:06) (74 - 94)  BP: 110/66 (21 @ 18:06) (95/60 - 121/74)  RR: 18 (21 @ 18:06) (17 - 22)  SpO2: 100% (21 @ 18:06) (99% - 100%)  Wt(kg): --  I&O's Summary    10 Sep 2021 07:  -  11 Sep 2021 07:00  --------------------------------------------------------  IN: 0 mL / OUT: 175 mL / NET: -175 mL    11 Sep 2021 07:01  -  11 Sep 2021 21:14  --------------------------------------------------------  IN: 50 mL / OUT: 200 mL / NET: -150 mL          Appearance: Normal	  HEENT:  PERRLA   Lymphatic: No lymphadenopathy   Cardiovascular: Normal S1 S2, no JVD  Respiratory: normal effort , clear  Gastrointestinal:  Soft, Non-tender  Skin: No rashes,  warm to touch  Psychiatry:  Mood & affect appropriate  Musculuskeletal: No edema      All labs, Imaging and EKGs personally reviewed     09-10-21 @ 07:01  -  21 @ 07:00  --------------------------------------------------------  IN: 0 mL / OUT: 175 mL / NET: -175 mL    21 @ 07:01  -  21 @ 21:14  --------------------------------------------------------  IN: 50 mL / OUT: 200 mL / NET: -150 mL                          7.8    8.96  )-----------( 272      ( 11 Sep 2021 06:08 )             25.5                   136  |  101  |  22  ----------------------------<  104<H>  4.3   |  23  |  0.97    Ca    9.1      11 Sep 2021 06:08    TPro  6.8  /  Alb  3.4  /  TBili  0.4  /  DBili  x   /  AST  11  /  ALT  17  /  AlkPhos  109  09-10    PT/INR - ( 11 Sep 2021 06:08 )   PT: 20.5 sec;   INR: 1.75 ratio         PTT - ( 11 Sep 2021 06:08 )  PTT:34.4 sec                   Urinalysis Basic - ( 10 Sep 2021 17:16 )    Color: Light Yellow / Appearance: Clear / S.010 / pH: x  Gluc: x / Ketone: Negative  / Bili: Negative / Urobili: Negative   Blood: x / Protein: Trace / Nitrite: Positive   Leuk Esterase: Large / RBC: 3 /hpf / WBC 57 /HPF   Sq Epi: x / Non Sq Epi: 0 /hpf / Bacteria: Many        < from: CT Abdomen and Pelvis w/ IV Cont (09.10.21 @ 19:10) >  IMPRESSION:  Status post cystectomy and ileal conduit. Curvilinear pelvic fluid collection extending from the cystectomy bed and along the pelvic sidewalls.  There is mild bilateral hydroureteronephrosis.

## 2021-09-11 NOTE — CONSULT NOTE ADULT - SUBJECTIVE AND OBJECTIVE BOX
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  The patient reports he had been recovering from his surgery, but still felt fatigued and some SOB. He noticed drainage of pus, redness and swelling at the pelvic incision site and reported to his doctor earlier today at [facility] during his scheduled follow-up visit, and was thus sent to the ED.  The patient denies fever, chills, nausea, vomiting. He gets constipation. Urine is clear yellow and nonbloody. He notes some odor when urine accumulates. He has not had dizziness or lightheadedness.  Of note, patient endorses having mild chronic SOB since his immunotherapy treatment.  Urology consulted for Curvilinear pelvic fluid collection extending from the cystectomy bed and along the pelvic sidewalls.  Labs showed UA nitrite+, WBC 12.8, H/H 8.3/27.6, Cr 0.93.      PAST MEDICAL & SURGICAL HISTORY:  Severe aortic stenosis    HTN (hypertension)    Rheumatic fever  in childhood    Atrial fibrillation, unspecified type    Urothelial cancer    History of appendectomy    History of cholecystectomy    History of nasal septoplasty    S/P prostatectomy      FAMILY HISTORY:  FH: gastric cancer  brother      SOCIAL HISTORY:   Tobacco hx:  MEDICATIONS  (STANDING):  diltiazem    Tablet 30 milliGRAM(s) Oral three times a day  hemorrhoidal Ointment 1 Application(s) Rectal two times a day  metoprolol succinate  milliGRAM(s) Oral daily  piperacillin/tazobactam IVPB.. 3.375 Gram(s) IV Intermittent every 8 hours    MEDICATIONS  (PRN):  acetaminophen   Tablet .. 650 milliGRAM(s) Oral every 6 hours PRN Temp greater or equal to 38.5C (101.3F), Mild Pain (1 - 3)  melatonin 3 milliGRAM(s) Oral at bedtime PRN Insomnia    Allergies    No Known Allergies    Intolerances        REVIEW OF SYSTEMS: Pertinent positives and negatives as stated in HPI, otherwise negative    Vital signs  T(C): 36.4 (21 @ 06:03), Max: 37 (09-10-21 @ 23:31)  HR: 94 (21 @ 06:03)  BP: 121/74 (21 @ 06:03)  SpO2: 100% (21 @ 06:03)  Wt(kg): --    Output      Physical Exam  Gen: NAD  Pulm: No respiratory distress, no subcostal retractions  CV: RRR, no JVD  Abd: Soft, NT, ND; incisional wound with some surrounding erythema and purulence  : stoma pink, patent; urine yellow with sediment      LABS:         @ 06:08    WBC 8.96  / Hct 25.5  / SCr --       09-10 @ 17:16    WBC 12.80 / Hct 27.6  / SCr 0.93     09-10    135  |  98  |  24<H>  ----------------------------<  136<H>  3.4<L>   |  24  |  0.93    Ca    9.4      10 Sep 2021 17:16    TPro  6.8  /  Alb  3.4  /  TBili  0.4  /  DBili  x   /  AST  11  /  ALT  17  /  AlkPhos  109  10    PT/INR - ( 11 Sep 2021 06:08 )   PT: 20.5 sec;   INR: 1.75 ratio         PTT - ( 11 Sep 2021 06:08 )  PTT:34.4 sec  Urinalysis Basic - ( 10 Sep 2021 17:16 )    Color: Light Yellow / Appearance: Clear / S.010 / pH: x  Gluc: x / Ketone: Negative  / Bili: Negative / Urobili: Negative   Blood: x / Protein: Trace / Nitrite: Positive   Leuk Esterase: Large / RBC: 3 /hpf / WBC 57 /HPF   Sq Epi: x / Non Sq Epi: 0 /hpf / Bacteria: Many        Urine Cx: pending      Radiology:    EXAM:  CT ABDOMEN AND PELVIS IC                            PROCEDURE DATE:  09/10/2021            INTERPRETATION:  CLINICAL INFORMATION: Increasing drainage from surgical wound. History of bladder resection.    COMPARISON: None.    CONTRAST/COMPLICATIONS:  IV Contrast: Omnipaque 350  90 cc administered   10 cc discarded  Oral Contrast: None  Complications: None reported at the time of study completion    PROCEDURE:  CT of the Abdomen and Pelvis was performed.  Sagittal and coronal reformats were performed.    FINDINGS:  LOWER CHEST: Cardiomegaly. Clear lungs.    LIVER: 3.7 cm right hepatic lobe cyst and additional scattered hypodensities too small characterize.  BILE DUCTS: Normal caliber.  GALLBLADDER: Cholecystectomy.  SPLEEN: Within normal limits.  PANCREAS: Within normal limits.  ADRENALS: Within normal limits.  KIDNEYS/URETERS: Symmetric enhancement. Mild bilateral hydroureteronephrosis. The ureters terminate in an ileal conduit.    BLADDER: Cystectomy.  REPRODUCTIVE ORGANS: Prostate within normal limits.    BOWEL: No bowel obstruction. Appendectomy.  PERITONEUM: Curvilinear rim-enhancing fluid collection along the right psoas and layering in the lower mid pelvic surgical bed. Measures 13.1 cm in the craniocaudal dimension. 7.6cm transverse, and 9.0 cm AP. Extensive inflammatory change at the anterior most aspect of the collection, seemingly adherent to the anterior abdominal wall. A surgical drain adjacent to the ileal conduit terminates in the right hemipelvis just medial to but not within the collection.  VESSELS: Atherosclerotic changes.  RETROPERITONEUM/LYMPH NODES: No lymphadenopathy.  ABDOMINAL WALL: Right lower quadrant ileal conduit. Extensive inflammatory changes in the lower anterior abdominal wall.  BONES: Degenerative changes of the spine. L2 vertebral body hemangioma.    IMPRESSION:  Status post cystectomy and ileal conduit. Curvilinear pelvic fluid collection extending from the cystectomy bed and along the pelvic sidewalls.  There is mild bilateral hydroureteronephrosis.    --- End of Report ---              OLGA LIDIA LORA MD; Resident Radiology  This document has been electronically signed.  ANAND HIGHTOWER MD; Attending Radiologist  This document has been electronically signed. Sep 10 2021  8:51PM

## 2021-09-11 NOTE — CONSULT NOTE ADULT - ASSESSMENT
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 Urology consulted for Curvilinear pelvic fluid collection extending from the cystectomy bed and along the pelvic sidewalls.  -no acute urologic intervention warranted at this time  -would recommend IR drainage for gram stain, culture and creatinine  -would advise consulting [facility] urologist Dr. Swann for ??drain seen on CT  -antibiotics for UTI  -f/u urine cx  -discussed with Dr. Satllings and Dr. Fernandez

## 2021-09-11 NOTE — PROGRESS NOTE ADULT - SUBJECTIVE AND OBJECTIVE BOX
Patient is a 81y old  Male who presents with a chief complaint of wound infection (11 Sep 2021 11:56)      MEDICATIONS  (STANDING):  diltiazem    Tablet 30 milliGRAM(s) Oral three times a day  hemorrhoidal Ointment 1 Application(s) Rectal two times a day  metoprolol succinate  milliGRAM(s) Oral daily  piperacillin/tazobactam IVPB.. 3.375 Gram(s) IV Intermittent every 8 hours    MEDICATIONS  (PRN):  acetaminophen   Tablet .. 650 milliGRAM(s) Oral every 6 hours PRN Temp greater or equal to 38.5C (101.3F), Mild Pain (1 - 3)  melatonin 3 milliGRAM(s) Oral at bedtime PRN Insomnia      Interim History:  No acute events over night. Vitals stable. Patient in no acute distress      Vital Signs Last 24 Hrs  T(C): 36.4 (11 Sep 2021 10:00), Max: 37 (10 Sep 2021 23:31)  T(F): 97.5 (11 Sep 2021 10:00), Max: 98.6 (10 Sep 2021 23:31)  HR: 80 (11 Sep 2021 10:00) (66 - 94)  BP: 101/67 (11 Sep 2021 10:00) (101/67 - 121/74)  BP(mean): --  RR: 18 (11 Sep 2021 10:00) (18 - 30)  SpO2: 100% (11 Sep 2021 10:00) (99% - 100%)      PE  NAD  Awake, alert  Anicteric, MMM  RRR  CTAB  Abd soft, NT, ND  No c/c/e  No rash grossly                            7.8    8.96  )-----------( 272      ( 11 Sep 2021 06:08 )             25.5       09-11    136  |  101  |  22  ----------------------------<  104<H>  4.3   |  23  |  0.97    Ca    9.1      11 Sep 2021 06:08    TPro  6.8  /  Alb  3.4  /  TBili  0.4  /  DBili  x   /  AST  11  /  ALT  17  /  Alk Phos  109  09-10

## 2021-09-11 NOTE — CONSULT NOTE ADULT - SUBJECTIVE AND OBJECTIVE BOX
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  Per notes, patient reported he had been recovering from his surgery, but still felt fatigued and some SOB. He noticed drainage of pus, redness and swelling at the pelvic incision site. CT demonstrated curvilinear pelvic fluid collection extending from the cystectomy bed and along the pelvic sidewalls. Labs showed UA nitrite+, WBC 12.8, H/H 8.3/27.6, Cr 0.93.    Allergies:   Medications (Abx/Cardiac/Anticoagulation/Blood Products)  diltiazem    Tablet: 30 milliGRAM(s) Oral (09-11 @ 06:04)  metoprolol succinate ER: 100 milliGRAM(s) Oral (09-11 @ 06:04)  piperacillin/tazobactam IVPB..: 25 mL/Hr IV Intermittent (09-11 @ 09:43)  piperacillin/tazobactam IVPB...: 200 mL/Hr IV Intermittent (09-10 @ 17:33)  vancomycin  IVPB.: 250 mL/Hr IV Intermittent (09-10 @ 18:18)    Data:  165.1  70.3  T(C): 36.4  HR: 80  BP: 101/67  RR: 18  SpO2: 100%    -WBC 8.96 / HgB 7.8 / Hct 25.5 / Plt 272  -Na 136 / Cl 101 / BUN 22 / Glucose 104  -K 4.3 / CO2 23 / Cr 0.97  -ALT -- / Alk Phos -- / T.Bili --  -INR1.75    Imaging: reviewed    Assessment:   81y Male with history of urothelial ca s/p robotic cystectomy, prostatectomy with enterostomy on 7/30/2021 who p/w suspected wound infection and was found to have a pelvic fluid collection extending from the cystectomy bed and along the pelvic sidewalls. Leukocytosis resolved with abx. Hemodynamically stable.    Plan:   - Will re-evaluate patient Monday to see how they respond to abx. Difficult window for access on most recent CT.  - NPOpMN for possible intervention Monday.  - Hold Xarelto x72 hours.  - Cont abx per primary.    p: [phone]

## 2021-09-12 LAB
ANION GAP SERPL CALC-SCNC: 13 MMOL/L — SIGNIFICANT CHANGE UP (ref 5–17)
BUN SERPL-MCNC: 23 MG/DL — SIGNIFICANT CHANGE UP (ref 7–23)
CALCIUM SERPL-MCNC: 9 MG/DL — SIGNIFICANT CHANGE UP (ref 8.4–10.5)
CHLORIDE SERPL-SCNC: 103 MMOL/L — SIGNIFICANT CHANGE UP (ref 96–108)
CO2 SERPL-SCNC: 23 MMOL/L — SIGNIFICANT CHANGE UP (ref 22–31)
COVID-19 SPIKE DOMAIN AB INTERP: POSITIVE
COVID-19 SPIKE DOMAIN ANTIBODY RESULT: 6.24 U/ML — HIGH
CREAT SERPL-MCNC: 1.25 MG/DL — SIGNIFICANT CHANGE UP (ref 0.5–1.3)
CULTURE RESULTS: SIGNIFICANT CHANGE UP
GLUCOSE SERPL-MCNC: 108 MG/DL — HIGH (ref 70–99)
HCT VFR BLD CALC: 24.3 % — LOW (ref 39–50)
HGB BLD-MCNC: 7.4 G/DL — LOW (ref 13–17)
MCHC RBC-ENTMCNC: 23.9 PG — LOW (ref 27–34)
MCHC RBC-ENTMCNC: 30.5 GM/DL — LOW (ref 32–36)
MCV RBC AUTO: 78.6 FL — LOW (ref 80–100)
NRBC # BLD: 0 /100 WBCS — SIGNIFICANT CHANGE UP (ref 0–0)
PLATELET # BLD AUTO: 278 K/UL — SIGNIFICANT CHANGE UP (ref 150–400)
POTASSIUM SERPL-MCNC: 3.4 MMOL/L — LOW (ref 3.5–5.3)
POTASSIUM SERPL-SCNC: 3.4 MMOL/L — LOW (ref 3.5–5.3)
RBC # BLD: 3.09 M/UL — LOW (ref 4.2–5.8)
RBC # FLD: 19.4 % — HIGH (ref 10.3–14.5)
SARS-COV-2 IGG+IGM SERPL QL IA: 6.24 U/ML — HIGH
SARS-COV-2 IGG+IGM SERPL QL IA: POSITIVE
SODIUM SERPL-SCNC: 139 MMOL/L — SIGNIFICANT CHANGE UP (ref 135–145)
SPECIMEN SOURCE: SIGNIFICANT CHANGE UP
WBC # BLD: 8.23 K/UL — SIGNIFICANT CHANGE UP (ref 3.8–10.5)
WBC # FLD AUTO: 8.23 K/UL — SIGNIFICANT CHANGE UP (ref 3.8–10.5)

## 2021-09-12 RX ORDER — POLYETHYLENE GLYCOL 3350 17 G/17G
17 POWDER, FOR SOLUTION ORAL ONCE
Refills: 0 | Status: COMPLETED | OUTPATIENT
Start: 2021-09-12 | End: 2021-09-12

## 2021-09-12 RX ORDER — SODIUM CHLORIDE 9 MG/ML
1000 INJECTION INTRAMUSCULAR; INTRAVENOUS; SUBCUTANEOUS
Refills: 0 | Status: DISCONTINUED | OUTPATIENT
Start: 2021-09-12 | End: 2021-09-16

## 2021-09-12 RX ADMIN — PHENYLEPHRINE-SHARK LIVER OIL-MINERAL OIL-PETROLATUM RECTAL OINTMENT 1 APPLICATION(S): at 05:16

## 2021-09-12 RX ADMIN — PIPERACILLIN AND TAZOBACTAM 25 GRAM(S): 4; .5 INJECTION, POWDER, LYOPHILIZED, FOR SOLUTION INTRAVENOUS at 10:26

## 2021-09-12 RX ADMIN — Medication 30 MILLIGRAM(S): at 22:05

## 2021-09-12 RX ADMIN — Medication 650 MILLIGRAM(S): at 02:28

## 2021-09-12 RX ADMIN — Medication 650 MILLIGRAM(S): at 01:09

## 2021-09-12 RX ADMIN — PIPERACILLIN AND TAZOBACTAM 25 GRAM(S): 4; .5 INJECTION, POWDER, LYOPHILIZED, FOR SOLUTION INTRAVENOUS at 01:02

## 2021-09-12 RX ADMIN — SODIUM CHLORIDE 75 MILLILITER(S): 9 INJECTION INTRAMUSCULAR; INTRAVENOUS; SUBCUTANEOUS at 10:26

## 2021-09-12 RX ADMIN — HEPARIN SODIUM 5000 UNIT(S): 5000 INJECTION INTRAVENOUS; SUBCUTANEOUS at 14:07

## 2021-09-12 RX ADMIN — HEPARIN SODIUM 5000 UNIT(S): 5000 INJECTION INTRAVENOUS; SUBCUTANEOUS at 22:05

## 2021-09-12 RX ADMIN — HEPARIN SODIUM 5000 UNIT(S): 5000 INJECTION INTRAVENOUS; SUBCUTANEOUS at 05:15

## 2021-09-12 RX ADMIN — POLYETHYLENE GLYCOL 3350 17 GRAM(S): 17 POWDER, FOR SOLUTION ORAL at 06:51

## 2021-09-12 RX ADMIN — Medication 3 MILLIGRAM(S): at 01:09

## 2021-09-12 RX ADMIN — PIPERACILLIN AND TAZOBACTAM 25 GRAM(S): 4; .5 INJECTION, POWDER, LYOPHILIZED, FOR SOLUTION INTRAVENOUS at 18:28

## 2021-09-12 NOTE — PROGRESS NOTE ADULT - SUBJECTIVE AND OBJECTIVE BOX
DAILY PROGRESS NOTE:         24 hr events:  waqas      Objective:    Vital Signs Last 24 Hrs  T(C): 36.5 (12 Sep 2021 17:21), Max: 36.7 (12 Sep 2021 13:42)  T(F): 97.7 (12 Sep 2021 17:21), Max: 98.1 (12 Sep 2021 13:42)  HR: 83 (12 Sep 2021 17:21) (70 - 89)  BP: 96/60 (12 Sep 2021 17:21) (93/56 - 107/62)  BP(mean): --  RR: 18 (12 Sep 2021 17:21) (17 - 18)  SpO2: 100% (12 Sep 2021 17:21) (97% - 100%)    I&O's Detail    11 Sep 2021 07:01  -  12 Sep 2021 07:00  --------------------------------------------------------  IN:    IV PiggyBack: 50 mL    Oral Fluid: 300 mL  Total IN: 350 mL    OUT:    Urostomy (mL): 545 mL  Total OUT: 545 mL    Total NET: -195 mL      12 Sep 2021 07:01  -  12 Sep 2021 20:03  --------------------------------------------------------  IN:    Oral Fluid: 780 mL    sodium chloride 0.9%: 750 mL  Total IN: 1530 mL    OUT:    Urostomy (mL): 750 mL  Total OUT: 750 mL    Total NET: 780 mL          Physical Exam:    General: NAD, well-nourished  Resp: Breathing comfortably on RA  CV: regular rate, no edema.   Abd: suprapubic incision w/ purulent drainage. can be expectorated w/ pressure over incision   Psych: AOx3  Neuro: No focal deficits       Laboratory Results:                          7.4    8.23  )-----------( 278      ( 12 Sep 2021 06:30 )             24.3     09-12    139  |  103  |  23  ----------------------------<  108<H>  3.4<L>   |  23  |  1.25    Ca    9.0      12 Sep 2021 06:30      PT/INR - ( 11 Sep 2021 06:08 )   PT: 20.5 sec;   INR: 1.75 ratio         PTT - ( 11 Sep 2021 06:08 )  PTT:34.4 sec

## 2021-09-12 NOTE — PROGRESS NOTE ADULT - SUBJECTIVE AND OBJECTIVE BOX
feels well    acetaminophen   Tablet .. 650 milliGRAM(s) Oral every 6 hours PRN  diltiazem    Tablet 30 milliGRAM(s) Oral three times a day  hemorrhoidal Ointment 1 Application(s) Rectal two times a day  heparin   Injectable 5000 Unit(s) SubCutaneous every 8 hours  influenza   Vaccine 0.5 milliLiter(s) IntraMuscular once  melatonin 3 milliGRAM(s) Oral at bedtime PRN  metoprolol succinate  milliGRAM(s) Oral daily  piperacillin/tazobactam IVPB.. 3.375 Gram(s) IV Intermittent every 8 hours  sodium chloride 0.9%. 1000 milliLiter(s) IV Continuous <Continuous>      No Known Allergies      ROS otherwise negative     T(C): 36.7 (09-12-21 @ 13:42), Max: 36.7 (09-12-21 @ 13:42)  HR: 77 (09-12-21 @ 13:42) (70 - 89)  BP: 105/64 (09-12-21 @ 13:42) (93/56 - 110/66)  RR: 17 (09-12-21 @ 13:42) (17 - 18)  SpO2: 97% (09-12-21 @ 13:42) (97% - 100%)  PHYSICAL EXAM  Gen:  laying in bed, nad  H:  anicteric, eomi  skin:  right ACW mediport w/o swelling or erythema  Ext:  no edema                          7.4    8.23  )-----------( 278      ( 12 Sep 2021 06:30 )             24.3                         7.8    8.96  )-----------( 272      ( 11 Sep 2021 06:08 )             25.5                         8.3    12.80 )-----------( 320      ( 10 Sep 2021 17:16 )             27.6   09-12    139  |  103  |  23  ----------------------------<  108<H>  3.4<L>   |  23  |  1.25    Ca    9.0      12 Sep 2021 06:30    TPro  6.8  /  Alb  3.4  /  TBili  0.4  /  DBili  x   /  AST  11  /  ALT  17  /  AlkPhos  109  09-10    Culture - Urine (09.10.21 @ 21:13)    Specimen Source: Clean Catch Clean Catch (Midstream)    Culture Results:   >=3 organisms. Probable collection contamination.

## 2021-09-12 NOTE — PROGRESS NOTE ADULT - SUBJECTIVE AND OBJECTIVE BOX
Name of Patient : RYAN FLOYD  MRN: [id number]  Date of visit: 09-12-21       Subjective: Patient seen and examined. No new events except as noted.   Doing okay  feeling much better     REVIEW OF SYSTEMS:    CONSTITUTIONAL: No weakness, fevers or chills  EYES/ENT: No visual changes;  No vertigo or throat pain   NECK: No pain or stiffness  RESPIRATORY: No cough, wheezing, hemoptysis; No shortness of breath  CARDIOVASCULAR: No chest pain or palpitations  GASTROINTESTINAL: No abdominal or epigastric pain.   GENITOURINARY: No dysuria, frequency or hematuria  NEUROLOGICAL: No numbness or weakness  SKIN: No itching, burning, rashes, or lesions   All other review of systems is negative unless indicated above.    MEDICATIONS:  MEDICATIONS  (STANDING):  diltiazem    Tablet 30 milliGRAM(s) Oral three times a day  hemorrhoidal Ointment 1 Application(s) Rectal two times a day  heparin   Injectable 5000 Unit(s) SubCutaneous every 8 hours  influenza   Vaccine 0.5 milliLiter(s) IntraMuscular once  metoprolol succinate  milliGRAM(s) Oral daily  piperacillin/tazobactam IVPB.. 3.375 Gram(s) IV Intermittent every 8 hours  sodium chloride 0.9%. 1000 milliLiter(s) (75 mL/Hr) IV Continuous <Continuous>      PHYSICAL EXAM:  T(C): 36.5 (09-12-21 @ 17:21), Max: 36.7 (09-12-21 @ 13:42)  HR: 83 (09-12-21 @ 17:21) (70 - 89)  BP: 96/60 (09-12-21 @ 17:21) (93/56 - 107/62)  RR: 18 (09-12-21 @ 17:21) (17 - 18)  SpO2: 100% (09-12-21 @ 17:21) (97% - 100%)  Wt(kg): --  I&O's Summary    11 Sep 2021 07:01  -  12 Sep 2021 07:00  --------------------------------------------------------  IN: 350 mL / OUT: 545 mL / NET: -195 mL    12 Sep 2021 07:01  -  12 Sep 2021 21:10  --------------------------------------------------------  IN: 1530 mL / OUT: 750 mL / NET: 780 mL          Appearance: Normal	  HEENT:  PERRLA   Lymphatic: No lymphadenopathy   Cardiovascular: Normal S1 S2, no JVD  Respiratory: normal effort , clear  Gastrointestinal:  Soft, Non-tender, enterostomy   Skin: No rashes,  warm to touch  Psychiatry:  Mood & affect appropriate  Musculuskeletal: No edema      All labs, Imaging and EKGs personally reviewed       09-11-21 @ 07:01  -  09-12-21 @ 07:00  --------------------------------------------------------  IN: 350 mL / OUT: 545 mL / NET: -195 mL    09-12-21 @ 07:01  -  09-12-21 @ 21:10  --------------------------------------------------------  IN: 1530 mL / OUT: 750 mL / NET: 780 mL                          7.4    8.23  )-----------( 278      ( 12 Sep 2021 06:30 )             24.3               09-12    139  |  103  |  23  ----------------------------<  108<H>  3.4<L>   |  23  |  1.25    Ca    9.0      12 Sep 2021 06:30      PT/INR - ( 11 Sep 2021 06:08 )   PT: 20.5 sec;   INR: 1.75 ratio         PTT - ( 11 Sep 2021 06:08 )  PTT:34.4 sec

## 2021-09-13 DIAGNOSIS — A49.8 OTHER BACTERIAL INFECTIONS OF UNSPECIFIED SITE: ICD-10-CM

## 2021-09-13 LAB
ANION GAP SERPL CALC-SCNC: 10 MMOL/L — SIGNIFICANT CHANGE UP (ref 5–17)
APTT BLD: 34.6 SEC — SIGNIFICANT CHANGE UP (ref 27.5–35.5)
BLD GP AB SCN SERPL QL: NEGATIVE — SIGNIFICANT CHANGE UP
BUN SERPL-MCNC: 20 MG/DL — SIGNIFICANT CHANGE UP (ref 7–23)
CALCIUM SERPL-MCNC: 8.6 MG/DL — SIGNIFICANT CHANGE UP (ref 8.4–10.5)
CHLORIDE SERPL-SCNC: 109 MMOL/L — HIGH (ref 96–108)
CO2 SERPL-SCNC: 22 MMOL/L — SIGNIFICANT CHANGE UP (ref 22–31)
CREAT SERPL-MCNC: 1.15 MG/DL — SIGNIFICANT CHANGE UP (ref 0.5–1.3)
GLUCOSE SERPL-MCNC: 110 MG/DL — HIGH (ref 70–99)
HCT VFR BLD CALC: 23.1 % — LOW (ref 39–50)
HCT VFR BLD CALC: 26.6 % — LOW (ref 39–50)
HGB BLD-MCNC: 6.9 G/DL — CRITICAL LOW (ref 13–17)
HGB BLD-MCNC: 7.9 G/DL — LOW (ref 13–17)
INR BLD: 1.33 RATIO — HIGH (ref 0.88–1.16)
MCHC RBC-ENTMCNC: 23.7 PG — LOW (ref 27–34)
MCHC RBC-ENTMCNC: 23.8 PG — LOW (ref 27–34)
MCHC RBC-ENTMCNC: 29.7 GM/DL — LOW (ref 32–36)
MCHC RBC-ENTMCNC: 29.9 GM/DL — LOW (ref 32–36)
MCV RBC AUTO: 79.6 FL — LOW (ref 80–100)
MCV RBC AUTO: 79.7 FL — LOW (ref 80–100)
NRBC # BLD: 0 /100 WBCS — SIGNIFICANT CHANGE UP (ref 0–0)
NRBC # BLD: 0 /100 WBCS — SIGNIFICANT CHANGE UP (ref 0–0)
PLATELET # BLD AUTO: 272 K/UL — SIGNIFICANT CHANGE UP (ref 150–400)
PLATELET # BLD AUTO: 289 K/UL — SIGNIFICANT CHANGE UP (ref 150–400)
POTASSIUM SERPL-MCNC: 3.5 MMOL/L — SIGNIFICANT CHANGE UP (ref 3.5–5.3)
POTASSIUM SERPL-SCNC: 3.5 MMOL/L — SIGNIFICANT CHANGE UP (ref 3.5–5.3)
PROTHROM AB SERPL-ACNC: 15.7 SEC — HIGH (ref 10.6–13.6)
RBC # BLD: 2.9 M/UL — LOW (ref 4.2–5.8)
RBC # BLD: 3.34 M/UL — LOW (ref 4.2–5.8)
RBC # FLD: 19.2 % — HIGH (ref 10.3–14.5)
RBC # FLD: 19.3 % — HIGH (ref 10.3–14.5)
RH IG SCN BLD-IMP: POSITIVE — SIGNIFICANT CHANGE UP
SODIUM SERPL-SCNC: 141 MMOL/L — SIGNIFICANT CHANGE UP (ref 135–145)
WBC # BLD: 6.44 K/UL — SIGNIFICANT CHANGE UP (ref 3.8–10.5)
WBC # BLD: 6.81 K/UL — SIGNIFICANT CHANGE UP (ref 3.8–10.5)
WBC # FLD AUTO: 6.44 K/UL — SIGNIFICANT CHANGE UP (ref 3.8–10.5)
WBC # FLD AUTO: 6.81 K/UL — SIGNIFICANT CHANGE UP (ref 3.8–10.5)

## 2021-09-13 PROCEDURE — 99222 1ST HOSP IP/OBS MODERATE 55: CPT

## 2021-09-13 RX ORDER — HEPARIN SODIUM 5000 [USP'U]/ML
5500 INJECTION INTRAVENOUS; SUBCUTANEOUS EVERY 6 HOURS
Refills: 0 | Status: DISCONTINUED | OUTPATIENT
Start: 2021-09-13 | End: 2021-09-14

## 2021-09-13 RX ORDER — HEPARIN SODIUM 5000 [USP'U]/ML
2500 INJECTION INTRAVENOUS; SUBCUTANEOUS EVERY 6 HOURS
Refills: 0 | Status: DISCONTINUED | OUTPATIENT
Start: 2021-09-13 | End: 2021-09-14

## 2021-09-13 RX ORDER — HEPARIN SODIUM 5000 [USP'U]/ML
INJECTION INTRAVENOUS; SUBCUTANEOUS
Qty: 25000 | Refills: 0 | Status: DISCONTINUED | OUTPATIENT
Start: 2021-09-13 | End: 2021-09-14

## 2021-09-13 RX ADMIN — HEPARIN SODIUM 5000 UNIT(S): 5000 INJECTION INTRAVENOUS; SUBCUTANEOUS at 05:52

## 2021-09-13 RX ADMIN — Medication 30 MILLIGRAM(S): at 15:04

## 2021-09-13 RX ADMIN — HEPARIN SODIUM 1200 UNIT(S)/HR: 5000 INJECTION INTRAVENOUS; SUBCUTANEOUS at 17:52

## 2021-09-13 RX ADMIN — PIPERACILLIN AND TAZOBACTAM 25 GRAM(S): 4; .5 INJECTION, POWDER, LYOPHILIZED, FOR SOLUTION INTRAVENOUS at 01:06

## 2021-09-13 RX ADMIN — PIPERACILLIN AND TAZOBACTAM 25 GRAM(S): 4; .5 INJECTION, POWDER, LYOPHILIZED, FOR SOLUTION INTRAVENOUS at 08:08

## 2021-09-13 RX ADMIN — PIPERACILLIN AND TAZOBACTAM 25 GRAM(S): 4; .5 INJECTION, POWDER, LYOPHILIZED, FOR SOLUTION INTRAVENOUS at 17:58

## 2021-09-13 RX ADMIN — HEPARIN SODIUM 5000 UNIT(S): 5000 INJECTION INTRAVENOUS; SUBCUTANEOUS at 15:04

## 2021-09-13 RX ADMIN — Medication 30 MILLIGRAM(S): at 21:29

## 2021-09-13 NOTE — CONSULT NOTE ADULT - REASON FOR ADMISSION
Fluid volume overload, suspected wound infection
wound infection
Fluid volume overload and suspected wound infection
wound infection
wound infection

## 2021-09-13 NOTE — CONSULT NOTE ADULT - PROBLEM SELECTOR RECOMMENDATION 9
-no plans for IR drainage  -cont zosyn 3.375 gm iv q8  -no fever  -wbc normal  -plan for po levofloxacin and flagyl on DC

## 2021-09-13 NOTE — CONSULT NOTE ADULT - ASSESSMENT
80 y/o gentleman with PMH of atrial fibrillation on xarelto, htn, rheumatic fever in childhood, AS s/p bioprosthetic cancer AVR, high grade muscle invasive urothelial cancer s/p 2 sessions immunotherapy (ipilimumab/nivolumab) c/b  autoimmune hepatitis, s/p robotic cystectomy, prostatectomy with enterostomy  on 7/30/2021 who presents to the ED from [facility] for suspected wound infection with pelvic collection     Dain Bhatti  Attending Physician   Division of Infectious Disease  Pager #[phone]  Available on Microsoft Teams also  After 5pm/weekend or no response, call #[phone]

## 2021-09-13 NOTE — PROGRESS NOTE ADULT - SUBJECTIVE AND OBJECTIVE BOX
Name of Patient : RYAN FLOYD  MRN: [id number]  Date of visit: 09-13-21 @ 17:49      Subjective: Patient seen and examined. No new events except as noted.   Doing better   pain improved  IR follow up appreictaed, no intervention for now     REVIEW OF SYSTEMS:    CONSTITUTIONAL: No weakness, fevers or chills  EYES/ENT: No visual changes;  No vertigo or throat pain   NECK: No pain or stiffness  RESPIRATORY: No cough, wheezing, hemoptysis; No shortness of breath  CARDIOVASCULAR: No chest pain or palpitations  GASTROINTESTINAL: No abdominal or epigastric pain. No nausea, vomiting, or hematemesis; No diarrhea or constipation. No melena or hematochezia.  GENITOURINARY: No dysuria, frequency or hematuria  NEUROLOGICAL: No numbness or weakness  SKIN: No itching, burning, rashes, or lesions   All other review of systems is negative unless indicated above.    MEDICATIONS:  MEDICATIONS  (STANDING):  diltiazem    Tablet 30 milliGRAM(s) Oral three times a day  hemorrhoidal Ointment 1 Application(s) Rectal two times a day  heparin  Infusion.  Unit(s)/Hr (12 mL/Hr) IV Continuous <Continuous>  influenza   Vaccine 0.5 milliLiter(s) IntraMuscular once  metoprolol succinate  milliGRAM(s) Oral daily  piperacillin/tazobactam IVPB.. 3.375 Gram(s) IV Intermittent every 8 hours  sodium chloride 0.9%. 1000 milliLiter(s) (75 mL/Hr) IV Continuous <Continuous>      PHYSICAL EXAM:  T(C): 36.4 (09-13-21 @ 16:13), Max: 36.8 (09-13-21 @ 01:22)  HR: 86 (09-13-21 @ 16:13) (75 - 86)  BP: 101/64 (09-13-21 @ 16:13) (100/61 - 127/73)  RR: 18 (09-13-21 @ 16:13) (16 - 18)  SpO2: 100% (09-13-21 @ 16:13) (98% - 100%)  Wt(kg): --  I&O's Summary    12 Sep 2021 07:01  -  13 Sep 2021 07:00  --------------------------------------------------------  IN: 2430 mL / OUT: 1050 mL / NET: 1380 mL    13 Sep 2021 07:01  -  13 Sep 2021 17:49  --------------------------------------------------------  IN: 715 mL / OUT: 500 mL / NET: 215 mL          Appearance: Normal	  HEENT:  PERRLA   Lymphatic: No lymphadenopathy   Cardiovascular: Normal S1 S2, no JVD  Respiratory: normal effort , clear  Gastrointestinal:  Soft, Non-tender  Skin: No rashes,  warm to touch  Psychiatry:  Mood & affect appropriate  Musculuskeletal: No edema      All labs, Imaging and EKGs personally reviewed     09-12-21 @ 07:01  -  09-13-21 @ 07:00  --------------------------------------------------------  IN: 2430 mL / OUT: 1050 mL / NET: 1380 mL    09-13-21 @ 07:01  -  09-13-21 @ 17:49  --------------------------------------------------------  IN: 715 mL / OUT: 500 mL / NET: 215 mL                          7.9    6.81  )-----------( 289      ( 13 Sep 2021 09:37 )             26.6               09-13    141  |  109<H>  |  20  ----------------------------<  110<H>  3.5   |  22  |  1.15    Ca    8.6      13 Sep 2021 06:22      PT/INR - ( 13 Sep 2021 17:19 )   PT: 15.7 sec;   INR: 1.33 ratio         PTT - ( 13 Sep 2021 17:19 )  PTT:34.6 sec

## 2021-09-13 NOTE — CHART NOTE - NSCHARTNOTEFT_GEN_A_CORE
Overall, doing well on abx. Normalized WBC. No fevers.    Anemia. Transfuse as needed.    No intervention today. Will continue to watch. Patient may ultimately need drainage.     Continue to hold Xarelto. Can start hep gtt if needed (no bolus).

## 2021-09-13 NOTE — PROGRESS NOTE ADULT - SUBJECTIVE AND OBJECTIVE BOX
Patient is a 81y old  Male who presents with a chief complaint of wound infection (13 Sep 2021 08:10)    Patient seen this morning. Was waiting for evaluation by IR re: drain of abscess but as per IR note - as patient is afebrile and WBC improved on antibiotics, they will defer drainage and prefer to watch.    MEDICATIONS  (STANDING):  diltiazem    Tablet 30 milliGRAM(s) Oral three times a day  hemorrhoidal Ointment 1 Application(s) Rectal two times a day  heparin   Injectable 5000 Unit(s) SubCutaneous every 8 hours  influenza   Vaccine 0.5 milliLiter(s) IntraMuscular once  metoprolol succinate  milliGRAM(s) Oral daily  piperacillin/tazobactam IVPB.. 3.375 Gram(s) IV Intermittent every 8 hours  sodium chloride 0.9%. 1000 milliLiter(s) (75 mL/Hr) IV Continuous <Continuous>    MEDICATIONS  (PRN):  acetaminophen   Tablet .. 650 milliGRAM(s) Oral every 6 hours PRN Temp greater or equal to 38.5C (101.3F), Mild Pain (1 - 3)  melatonin 3 milliGRAM(s) Oral at bedtime PRN Insomnia      ROS  No fever, sweats, chills  No epistaxis, HA, sore throat  No CP, SOB, cough, sputum  No n/v/d, abd pain, melena, hematochezia  No edema  No rash  No anxiety  No back pain, joint pain  No bleeding, bruising  No dysuria, hematuria    Vital Signs Last 24 Hrs  T(C): 36.5 (13 Sep 2021 09:16), Max: 36.8 (13 Sep 2021 01:22)  T(F): 97.7 (13 Sep 2021 09:16), Max: 98.3 (13 Sep 2021 01:22)  HR: 77 (13 Sep 2021 09:16) (75 - 83)  BP: 118/69 (13 Sep 2021 09:16) (96/60 - 127/73)  BP(mean): --  RR: 16 (13 Sep 2021 09:16) (16 - 18)  SpO2: 98% (13 Sep 2021 09:16) (97% - 100%)    PE  NAD  Awake, alert  Anicteric, MMM  Ilial conduit in place  Bilateral pedal edema  No rash grossly                            7.9    6.81  )-----------( 289      ( 13 Sep 2021 09:37 )             26.6       09-13    141  |  109<H>  |  20  ----------------------------<  110<H>  3.5   |  22  |  1.15    Ca    8.6      13 Sep 2021 06:22

## 2021-09-13 NOTE — PROGRESS NOTE ADULT - SUBJECTIVE AND OBJECTIVE BOX
Subjective  Awaiting IR procedure today. Currently offers no complaints. States ileal conduit bag needs daily changing, that is his only issue.    Objective    Vital signs  T(F): , Max: 98.3 (09-13-21 @ 01:22)  HR: 80 (09-13-21 @ 05:52)  BP: 100/61 (09-13-21 @ 05:52)  SpO2: 99% (09-13-21 @ 05:52)  Wt(kg): --    Output     OUT:    Urostomy (mL): 1050 mL  Total OUT: 1050 mL    Total NET: -1050 mL      OUT:    Urostomy (mL): 300 mL  Total OUT: 300 mL    Total NET: -300 mL      General: NAD, well-nourished  Abd: suprapubic incision w/ expressible purulent drainage  : +ileal conduit draining yellow with debris      Labs      09-13 @ 06:22    WBC 6.44  / Hct 23.1  / SCr 1.15     09-12 @ 06:30    WBC 8.23  / Hct 24.3  / SCr 1.25         Culture - Surgical Swab (collected 09-11-21 @ 01:17)  Source: .Surgical Swab Other  Preliminary Report (09-11-21 @ 23:01):    Rare Enterobacter cloacae complex    Few Coag Negative Staphylococcus "Susceptibilities not performed"  Organism: Enterobacter cloacae complex (09-12-21 @ 19:30)  Organism: Enterobacter cloacae complex (09-12-21 @ 19:30)      -  Amikacin: S <=16      -  Amoxicillin/Clavulanic Acid: R >16/8      -  Ampicillin: R >16 These ampicillin results predict results for amoxicillin      -  Ampicillin/Sulbactam: R >16/8 Enterobacter, Citrobacter, and Serratia may develop resistance during prolonged therapy (3-4 days)      -  Aztreonam: S <=4      -  Cefazolin: R >16 Enterobacter, Citrobacter, and Serratia may develop resistance during prolonged therapy (3-4 days)      -  Cefepime: S <=2      -  Cefoxitin: R >16      -  Ceftriaxone: S <=1 Enterobacter, Citrobacter, and Serratia may develop resistance during prolonged therapy      -  Ciprofloxacin: S <=0.25      -  Ertapenem: S <=0.5      -  Gentamicin: S <=2      -  Imipenem: S <=1      -  Levofloxacin: S <=0.5      -  Meropenem: S <=1      -  Piperacillin/Tazobactam: S <=8      -  Tobramycin: S <=2      -  Trimethoprim/Sulfamethoxazole: S <=0.5/9.5      Method Type: ANA    Culture - Urine (collected 09-10-21 @ 21:13)  Source: Clean Catch Clean Catch (Midstream)  Final Report (09-12-21 @ 10:19):    >=3 organisms. Probable collection contamination.    Culture - Blood (collected 09-10-21 @ 21:00)  Source: .Blood Blood  Preliminary Report (09-11-21 @ 22:01):    No growth to date.    Culture - Blood (collected 09-10-21 @ 21:00)  Source: .Blood Blood  Preliminary Report (09-11-21 @ 22:01):    No growth to date.        Urine Cx: ?  Blood Cx: ?    Imaging

## 2021-09-13 NOTE — CONSULT NOTE ADULT - SUBJECTIVE AND OBJECTIVE BOX
RYAN FLOYD 81y Male  MRN-[id number]    Patient is a 81y old  Male who presents with a chief complaint of wound infection (13 Sep 2021 11:05)      HPI:  This patient is a 82yo gentleman with PMH of atrial fibrillation on xarelto, htn, rheumatic fever in childhood, AS s/p bioprosthetic cancer AVR, high grade muscle invasive urothelial cancer s/p 2 sessions immunotherapy (ipilimumab/nivolumab) c/b  autoimmune hepatitis, s/p robotic cystectomy, prostatectomy with enterostomy  on 7/30/2021 who presents to the ED from [facility] for suspected wound infection. History was provided by the patient and his son, Curry at bedside. The patient reports he had been recovering from his surgery, but still felt fatigued. He noticed drainage of pus, redness and swelling at the pelvic incision site and reported to his doctor earlier today at [facility] during his scheduled follow-up visit, and was thus sent to the ED.  The patient denies fever, chills, nausea, vomiting. He gets constipation. Urine is clear yellow and nonbloody. He notes some odor when urine accumulates. He has not had dizziness or lightheadedness.  Of note, patient endorses having mild chronic SOB since his immunotherapy treatment. He has chronic pedal edema since the surgery which has been improving. He had US to rule out DVT. (10 Sep 2021 21:34)    ID called for help with abx.     No fever.     PAST MEDICAL & SURGICAL HISTORY:  Severe aortic stenosis    HTN (hypertension)    Rheumatic fever  in childhood    Atrial fibrillation, unspecified type    Urothelial cancer    History of appendectomy    History of cholecystectomy    History of nasal septoplasty    S/P prostatectomy        Allergies    No Known Allergies    Intolerances        ANTIMICROBIALS:  piperacillin/tazobactam IVPB.. 3.375 every 8 hours      MEDICATIONS  (STANDING):  diltiazem    Tablet 30 milliGRAM(s) Oral three times a day  hemorrhoidal Ointment 1 Application(s) Rectal two times a day  heparin   Injectable 5000 Unit(s) SubCutaneous every 8 hours  influenza   Vaccine 0.5 milliLiter(s) IntraMuscular once  metoprolol succinate  milliGRAM(s) Oral daily  piperacillin/tazobactam IVPB.. 3.375 Gram(s) IV Intermittent every 8 hours  sodium chloride 0.9%. 1000 milliLiter(s) (75 mL/Hr) IV Continuous <Continuous>      Social History  Smoking: former  Etoh: no  Drug use: no       FAMILY HISTORY:  FH: gastric cancer  brother        Vital Signs Last 24 Hrs  T(C): 36.5 (13 Sep 2021 09:16), Max: 36.8 (13 Sep 2021 01:22)  T(F): 97.7 (13 Sep 2021 09:16), Max: 98.3 (13 Sep 2021 01:22)  HR: 77 (13 Sep 2021 09:16) (75 - 83)  BP: 118/69 (13 Sep 2021 09:16) (96/60 - 127/73)  BP(mean): --  RR: 16 (13 Sep 2021 09:16) (16 - 18)  SpO2: 98% (13 Sep 2021 09:16) (98% - 100%)    CBC Full  -  ( 13 Sep 2021 09:37 )  WBC Count : 6.81 K/uL  RBC Count : 3.34 M/uL  Hemoglobin : 7.9 g/dL  Hematocrit : 26.6 %  Platelet Count - Automated : 289 K/uL  Mean Cell Volume : 79.6 fl  Mean Cell Hemoglobin : 23.7 pg  Mean Cell Hemoglobin Concentration : 29.7 gm/dL  Auto Neutrophil # : x  Auto Lymphocyte # : x  Auto Monocyte # : x  Auto Eosinophil # : x  Auto Basophil # : x  Auto Neutrophil % : x  Auto Lymphocyte % : x  Auto Monocyte % : x  Auto Eosinophil % : x  Auto Basophil % : x    09-13    141  |  109<H>  |  20  ----------------------------<  110<H>  3.5   |  22  |  1.15    Ca    8.6      13 Sep 2021 06:22            MICROBIOLOGY:  .Surgical Swab Other  09-11-21   Rare Enterobacter cloacae complex  Few Coag Negative Staphylococcus "Susceptibilities not performed"  --  Enterobacter cloacae complex      Clean Catch Clean Catch (Midstream)  09-10-21   >=3 organisms. Probable collection contamination.  --  --      .Blood Blood  09-10-21   No growth to date.  --  --        Rapid RVP Result: NotDetec (09-10 @ 17:14)      RADIOLOGY  < from: CT Abdomen and Pelvis w/ IV Cont (09.10.21 @ 19:10) >  Status post cystectomy and ileal conduit. Curvilinear pelvic fluid collection extending from the cystectomy bed and along the pelvic sidewalls.  There is mild bilateral hydroureteronephrosis.    < end of copied text >

## 2021-09-13 NOTE — CONSULT NOTE ADULT - CONSULT REASON
81yoM with urothelial cancer, AS s/p AVR, afib on xarelto had recent prostatectomy and cystectomy with ileal conduit, p/w wound infection and UTI and found to have large pelvic fluid collection, with drain not in collection on imaging. Patient may need to have drain repositioned/replaced.
bladder cancer
Muscle invasive bladder cancer
collection with ileoconduit
positive cultures

## 2021-09-14 LAB
ALBUMIN SERPL ELPH-MCNC: 3 G/DL — LOW (ref 3.3–5)
ALP SERPL-CCNC: 79 U/L — SIGNIFICANT CHANGE UP (ref 40–120)
ALT FLD-CCNC: 13 U/L — SIGNIFICANT CHANGE UP (ref 10–45)
ANION GAP SERPL CALC-SCNC: 12 MMOL/L — SIGNIFICANT CHANGE UP (ref 5–17)
APTT BLD: 144.9 SEC — CRITICAL HIGH (ref 27.5–35.5)
APTT BLD: 159.4 SEC — CRITICAL HIGH (ref 27.5–35.5)
AST SERPL-CCNC: 10 U/L — SIGNIFICANT CHANGE UP (ref 10–40)
BILIRUB SERPL-MCNC: 0.3 MG/DL — SIGNIFICANT CHANGE UP (ref 0.2–1.2)
BUN SERPL-MCNC: 17 MG/DL — SIGNIFICANT CHANGE UP (ref 7–23)
CALCIUM SERPL-MCNC: 8.9 MG/DL — SIGNIFICANT CHANGE UP (ref 8.4–10.5)
CHLORIDE SERPL-SCNC: 106 MMOL/L — SIGNIFICANT CHANGE UP (ref 96–108)
CO2 SERPL-SCNC: 23 MMOL/L — SIGNIFICANT CHANGE UP (ref 22–31)
CREAT SERPL-MCNC: 1.05 MG/DL — SIGNIFICANT CHANGE UP (ref 0.5–1.3)
FERRITIN SERPL-MCNC: 122 NG/ML — SIGNIFICANT CHANGE UP (ref 30–400)
FOLATE SERPL-MCNC: 8.7 NG/ML — SIGNIFICANT CHANGE UP
GLUCOSE SERPL-MCNC: 114 MG/DL — HIGH (ref 70–99)
HAPTOGLOB SERPL-MCNC: 187 MG/DL — SIGNIFICANT CHANGE UP (ref 34–200)
HCT VFR BLD CALC: 24.1 % — LOW (ref 39–50)
HCT VFR BLD CALC: 24.8 % — LOW (ref 39–50)
HGB BLD-MCNC: 7.2 G/DL — LOW (ref 13–17)
HGB BLD-MCNC: 7.4 G/DL — LOW (ref 13–17)
IRON SATN MFR SERPL: 15 % — LOW (ref 16–55)
IRON SATN MFR SERPL: 34 UG/DL — LOW (ref 45–165)
LDH SERPL L TO P-CCNC: 166 U/L — SIGNIFICANT CHANGE UP (ref 50–242)
MCHC RBC-ENTMCNC: 23.5 PG — LOW (ref 27–34)
MCHC RBC-ENTMCNC: 23.9 PG — LOW (ref 27–34)
MCHC RBC-ENTMCNC: 29.8 GM/DL — LOW (ref 32–36)
MCHC RBC-ENTMCNC: 29.9 GM/DL — LOW (ref 32–36)
MCV RBC AUTO: 78.5 FL — LOW (ref 80–100)
MCV RBC AUTO: 80 FL — SIGNIFICANT CHANGE UP (ref 80–100)
NRBC # BLD: 0 /100 WBCS — SIGNIFICANT CHANGE UP (ref 0–0)
NRBC # BLD: 0 /100 WBCS — SIGNIFICANT CHANGE UP (ref 0–0)
PLATELET # BLD AUTO: 275 K/UL — SIGNIFICANT CHANGE UP (ref 150–400)
PLATELET # BLD AUTO: 277 K/UL — SIGNIFICANT CHANGE UP (ref 150–400)
POTASSIUM SERPL-MCNC: 4.3 MMOL/L — SIGNIFICANT CHANGE UP (ref 3.5–5.3)
POTASSIUM SERPL-SCNC: 4.3 MMOL/L — SIGNIFICANT CHANGE UP (ref 3.5–5.3)
PROT SERPL-MCNC: 5.8 G/DL — LOW (ref 6–8.3)
RBC # BLD: 3.07 M/UL — LOW (ref 4.2–5.8)
RBC # BLD: 3.1 M/UL — LOW (ref 4.2–5.8)
RBC # FLD: 19.1 % — HIGH (ref 10.3–14.5)
RBC # FLD: 19.1 % — HIGH (ref 10.3–14.5)
SODIUM SERPL-SCNC: 141 MMOL/L — SIGNIFICANT CHANGE UP (ref 135–145)
TIBC SERPL-MCNC: 223 UG/DL — SIGNIFICANT CHANGE UP (ref 220–430)
UIBC SERPL-MCNC: 189 UG/DL — SIGNIFICANT CHANGE UP (ref 110–370)
VIT B12 SERPL-MCNC: 348 PG/ML — SIGNIFICANT CHANGE UP (ref 232–1245)
WBC # BLD: 7.48 K/UL — SIGNIFICANT CHANGE UP (ref 3.8–10.5)
WBC # BLD: 8.74 K/UL — SIGNIFICANT CHANGE UP (ref 3.8–10.5)
WBC # FLD AUTO: 7.48 K/UL — SIGNIFICANT CHANGE UP (ref 3.8–10.5)
WBC # FLD AUTO: 8.74 K/UL — SIGNIFICANT CHANGE UP (ref 3.8–10.5)

## 2021-09-14 PROCEDURE — 49406 IMAGE CATH FLUID PERI/RETRO: CPT

## 2021-09-14 PROCEDURE — 99232 SBSQ HOSP IP/OBS MODERATE 35: CPT

## 2021-09-14 RX ORDER — SODIUM CHLORIDE 9 MG/ML
1000 INJECTION, SOLUTION INTRAVENOUS
Refills: 0 | Status: DISCONTINUED | OUTPATIENT
Start: 2021-09-14 | End: 2021-09-15

## 2021-09-14 RX ADMIN — HEPARIN SODIUM 0 UNIT(S)/HR: 5000 INJECTION INTRAVENOUS; SUBCUTANEOUS at 08:42

## 2021-09-14 RX ADMIN — SODIUM CHLORIDE 75 MILLILITER(S): 9 INJECTION, SOLUTION INTRAVENOUS at 19:42

## 2021-09-14 RX ADMIN — HEPARIN SODIUM 1000 UNIT(S)/HR: 5000 INJECTION INTRAVENOUS; SUBCUTANEOUS at 01:55

## 2021-09-14 RX ADMIN — PIPERACILLIN AND TAZOBACTAM 25 GRAM(S): 4; .5 INJECTION, POWDER, LYOPHILIZED, FOR SOLUTION INTRAVENOUS at 00:51

## 2021-09-14 RX ADMIN — HEPARIN SODIUM 0 UNIT(S)/HR: 5000 INJECTION INTRAVENOUS; SUBCUTANEOUS at 00:50

## 2021-09-14 RX ADMIN — PIPERACILLIN AND TAZOBACTAM 25 GRAM(S): 4; .5 INJECTION, POWDER, LYOPHILIZED, FOR SOLUTION INTRAVENOUS at 19:42

## 2021-09-14 RX ADMIN — Medication 3 MILLIGRAM(S): at 00:52

## 2021-09-14 RX ADMIN — Medication 3 MILLIGRAM(S): at 21:58

## 2021-09-14 RX ADMIN — HEPARIN SODIUM 800 UNIT(S)/HR: 5000 INJECTION INTRAVENOUS; SUBCUTANEOUS at 10:07

## 2021-09-14 RX ADMIN — Medication 30 MILLIGRAM(S): at 06:10

## 2021-09-14 RX ADMIN — Medication 30 MILLIGRAM(S): at 21:58

## 2021-09-14 RX ADMIN — Medication 100 MILLIGRAM(S): at 06:10

## 2021-09-14 RX ADMIN — PIPERACILLIN AND TAZOBACTAM 25 GRAM(S): 4; .5 INJECTION, POWDER, LYOPHILIZED, FOR SOLUTION INTRAVENOUS at 08:45

## 2021-09-14 NOTE — PROGRESS NOTE ADULT - SUBJECTIVE AND OBJECTIVE BOX
Name of Patient : RYAN FLOYD  MRN: [id number]  Date of visit: 09-14-21       Subjective: Patient seen and examined. No new events except as noted.   plan for IR dfdraiange today   hold heparin     REVIEW OF SYSTEMS:    CONSTITUTIONAL: No weakness, fevers or chills  EYES/ENT: No visual changes;  No vertigo or throat pain   NECK: No pain or stiffness  RESPIRATORY: No cough, wheezing, hemoptysis; No shortness of breath  CARDIOVASCULAR: No chest pain or palpitations  GASTROINTESTINAL: No abdominal or epigastric pain. No nausea, vomiting, or hematemesis; No diarrhea or constipation. No melena or hematochezia.  GENITOURINARY: No dysuria, frequency or hematuria  NEUROLOGICAL: No numbness or weakness  SKIN: No itching, burning, rashes, or lesions   All other review of systems is negative unless indicated above.    MEDICATIONS:  MEDICATIONS  (STANDING):  diltiazem    Tablet 30 milliGRAM(s) Oral three times a day  hemorrhoidal Ointment 1 Application(s) Rectal two times a day  influenza   Vaccine 0.5 milliLiter(s) IntraMuscular once  lactated ringers. 1000 milliLiter(s) (75 mL/Hr) IV Continuous <Continuous>  metoprolol succinate  milliGRAM(s) Oral daily  piperacillin/tazobactam IVPB.. 3.375 Gram(s) IV Intermittent every 8 hours  sodium chloride 0.9%. 1000 milliLiter(s) (75 mL/Hr) IV Continuous <Continuous>      PHYSICAL EXAM:  T(C): 36.6 (09-14-21 @ 21:58), Max: 37.7 (09-14-21 @ 17:05)  HR: 71 (09-14-21 @ 21:58) (54 - 81)  BP: 120/62 (09-14-21 @ 21:58) (101/50 - 125/65)  RR: 18 (09-14-21 @ 21:58) (16 - 20)  SpO2: 100% (09-14-21 @ 21:58) (95% - 100%)  Wt(kg): --  I&O's Summary    13 Sep 2021 07:01  -  14 Sep 2021 07:00  --------------------------------------------------------  IN: 1407 mL / OUT: 1325 mL / NET: 82 mL    14 Sep 2021 07:01  -  14 Sep 2021 23:20  --------------------------------------------------------  IN: 600 mL / OUT: 450 mL / NET: 150 mL      Height (cm): 165.1 (09-14 @ 15:20)  Weight (kg): 70.3 (09-14 @ 15:20)  BMI (kg/m2): 25.8 (09-14 @ 15:20)  BSA (m2): 1.77 (09-14 @ 15:20)    Appearance: Normal	  HEENT:  PERRLA   Lymphatic: No lymphadenopathy   Cardiovascular: Normal S1 S2, no JVD  Respiratory: normal effort , clear  Gastrointestinal:  Soft, Non-tender  Skin: No rashes,  warm to touch  Psychiatry:  Mood & affect appropriate  Musculuskeletal: No edema      All labs, Imaging and EKGs personally reviewed         09-13-21 @ 07:01  -  09-14-21 @ 07:00  --------------------------------------------------------  IN: 1407 mL / OUT: 1325 mL / NET: 82 mL    09-14-21 @ 07:01  -  09-14-21 @ 23:20  --------------------------------------------------------  IN: 600 mL / OUT: 450 mL / NET: 150 mL                          7.4    8.74  )-----------( 275      ( 14 Sep 2021 08:07 )             24.8               09-14    141  |  106  |  17  ----------------------------<  114<H>  4.3   |  23  |  1.05    Ca    8.9      14 Sep 2021 08:07    TPro  5.8<L>  /  Alb  3.0<L>  /  TBili  0.3  /  DBili  x   /  AST  10  /  ALT  13  /  AlkPhos  79  09-14    PT/INR - ( 13 Sep 2021 17:19 )   PT: 15.7 sec;   INR: 1.33 ratio         PTT - ( 14 Sep 2021 08:07 )  PTT:159.4 sec

## 2021-09-14 NOTE — PRE PROCEDURE NOTE - PRE PROCEDURE EVALUATION
Interventional Radiology    HPI: 81y Male with hx of high grade muscle invasive urothelial cancer s/p 2 sessions immunotherapy (ipilimumab/nivolumab) c/b autoimmune hepatitis, s/p robotic cystectomy, prostatectomy with enterostomy on 7/30/2021 who presents to the ED from [facility] (Dr. Franklin Swann) for suspected wound infection. Urology consulted for Curvilinear pelvic fluid collection extending from the cystectomy bed and along the pelvic sidewalls. Pt referred to IR for drainage of fluid collection.     NPO: NPO past midnight.     Allergies:   Medications (Abx/Cardiac/Anticoagulation/Blood Products)  diltiazem    Tablet: 30 milliGRAM(s) Oral (09-14 @ 06:10)  heparin   Injectable: 5000 Unit(s) SubCutaneous (09-13 @ 15:04)  heparin  Infusion.: 800 Unit(s)/Hr IV Continuous (09-14 @ 08:43) off at 11:00AM.  metoprolol succinate ER: 100 milliGRAM(s) Oral (09-14 @ 06:10)  piperacillin/tazobactam IVPB..: 25 mL/Hr IV Intermittent (09-14 @ 08:45)    Data:    T(C): 36.4  HR: 65  BP: 107/65  RR: 18  SpO2: 100%    Other postprocedural shock, initial encounter    No pertinent family history in first degree relatives    FH: gastric cancer    Handoff    MEWS Score    Severe aortic stenosis    HTN (hypertension)    Rheumatic fever    Atrial fibrillation, unspecified type    Urothelial cancer    Abdominal wall abscess at site of surgical wound    Urothelial cancer    Hypertension    Acute UTI    Wound infection    Pelvic fluid collection    Prophylactic measure    Chronic atrial fibrillation    Citrobacter infection    History of appendectomy    History of cholecystectomy    History of nasal septoplasty    S/P prostatectomy    PULMONARY EDEMA    90+    [email or web address]        Exam  General: No acute distress  Chest: Non labored breathing    -WBC 8.74 / HgB 7.4 / Hct 24.8 / Plt 275  -Na 141 / Cl 106 / BUN 17 / Glucose 114  -K 4.3 / CO2 23 / Cr 1.05  -ALT 13 / Alk Phos 79 / T.Bili 0.3  -INR1.33    Imaging: < from: CT Abdomen and Pelvis w/ IV Cont (09.10.21 @ 19:10) >  Status post cystectomy and ileal conduit. Curvilinear pelvic fluid collection extending from the cystectomy bed and along the pelvic sidewalls.  There is mild bilateral hydroureteronephrosis.    < end of copied text >      Plan: 81y Male presents for drainage of pelvic abscess.  -Risks/Benefits/alternatives explained with the patient  and witnessed informed consent obtained.

## 2021-09-14 NOTE — PROGRESS NOTE ADULT - SUBJECTIVE AND OBJECTIVE BOX
Patient is a 81y old  Male who presents with a chief complaint of wound infection (14 Sep 2021 09:54)    Patient seen this morning. No new complaints.    MEDICATIONS  (STANDING):  diltiazem    Tablet 30 milliGRAM(s) Oral three times a day  hemorrhoidal Ointment 1 Application(s) Rectal two times a day  heparin  Infusion.  Unit(s)/Hr (12 mL/Hr) IV Continuous <Continuous>  influenza   Vaccine 0.5 milliLiter(s) IntraMuscular once  metoprolol succinate  milliGRAM(s) Oral daily  piperacillin/tazobactam IVPB.. 3.375 Gram(s) IV Intermittent every 8 hours  sodium chloride 0.9%. 1000 milliLiter(s) (75 mL/Hr) IV Continuous <Continuous>    MEDICATIONS  (PRN):  acetaminophen   Tablet .. 650 milliGRAM(s) Oral every 6 hours PRN Temp greater or equal to 38.5C (101.3F), Mild Pain (1 - 3)  heparin   Injectable 5500 Unit(s) IV Push every 6 hours PRN For aPTT less than 40  heparin   Injectable 2500 Unit(s) IV Push every 6 hours PRN For aPTT between 40 - 57  melatonin 3 milliGRAM(s) Oral at bedtime PRN Insomnia      ROS  No fever, sweats, chills  No epistaxis, HA, sore throat  No CP, SOB, cough, sputum  No n/v/d, abd pain, melena, hematochezia  No edema  No rash  No anxiety  No back pain, joint pain  No bleeding, bruising  No dysuria, hematuria    Vital Signs Last 24 Hrs  T(C): 36.5 (14 Sep 2021 09:14), Max: 36.8 (14 Sep 2021 05:44)  T(F): 97.7 (14 Sep 2021 09:14), Max: 98.2 (14 Sep 2021 05:44)  HR: 54 (14 Sep 2021 09:14) (54 - 87)  BP: 101/50 (14 Sep 2021 09:14) (101/50 - 123/75)  BP(mean): --  RR: 18 (14 Sep 2021 09:14) (17 - 18)  SpO2: 99% (14 Sep 2021 09:14) (95% - 100%)    PE  NAD  Awake, alert  Anicteric, MMM  Bilateral pedal edema - stable  No rash grossly                            7.4    8.74  )-----------( 275      ( 14 Sep 2021 08:07 )             24.8       09-14    141  |  106  |  17  ----------------------------<  114<H>  4.3   |  23  |  1.05    Ca    8.9      14 Sep 2021 08:07    TPro  5.8<L>  /  Alb  3.0<L>  /  TBili  0.3  /  DBili  x   /  AST  10  /  ALT  13  /  AlkPhos  79  09-14

## 2021-09-14 NOTE — CHART NOTE - NSCHARTNOTEFT_GEN_A_CORE
Discussed with IR, planning for drainage today and cinogram to evaluate communication between draining sinus and abdominal fluid collection.  Please hold hep gtt prior to procedure, timing per IR instructions.

## 2021-09-14 NOTE — PROGRESS NOTE ADULT - SUBJECTIVE AND OBJECTIVE BOX
RYAN FLOYD 81y [id number]    Patient is a 81y old  Male who presents with a chief complaint of wound infection (13 Sep 2021 17:49)      Follow Up/CC:  ID following for wound infection    Interval History/ROS: no fever, no pain    Allergies    No Known Allergies    Intolerances        ANTIMICROBIALS:  piperacillin/tazobactam IVPB.. 3.375 every 8 hours      MEDICATIONS  (STANDING):  diltiazem    Tablet 30 milliGRAM(s) Oral three times a day  hemorrhoidal Ointment 1 Application(s) Rectal two times a day  heparin  Infusion.  Unit(s)/Hr (12 mL/Hr) IV Continuous <Continuous>  influenza   Vaccine 0.5 milliLiter(s) IntraMuscular once  metoprolol succinate  milliGRAM(s) Oral daily  piperacillin/tazobactam IVPB.. 3.375 Gram(s) IV Intermittent every 8 hours  sodium chloride 0.9%. 1000 milliLiter(s) (75 mL/Hr) IV Continuous <Continuous>    MEDICATIONS  (PRN):  acetaminophen   Tablet .. 650 milliGRAM(s) Oral every 6 hours PRN Temp greater or equal to 38.5C (101.3F), Mild Pain (1 - 3)  heparin   Injectable 5500 Unit(s) IV Push every 6 hours PRN For aPTT less than 40  heparin   Injectable 2500 Unit(s) IV Push every 6 hours PRN For aPTT between 40 - 57  melatonin 3 milliGRAM(s) Oral at bedtime PRN Insomnia        Vital Signs Last 24 Hrs  T(C): 36.5 (14 Sep 2021 09:14), Max: 36.8 (14 Sep 2021 05:44)  T(F): 97.7 (14 Sep 2021 09:14), Max: 98.2 (14 Sep 2021 05:44)  HR: 54 (14 Sep 2021 09:14) (54 - 87)  BP: 101/50 (14 Sep 2021 09:14) (101/50 - 123/75)  BP(mean): --  RR: 18 (14 Sep 2021 09:14) (17 - 18)  SpO2: 99% (14 Sep 2021 09:14) (95% - 100%)    CBC Full  -  ( 14 Sep 2021 08:07 )  WBC Count : 8.74 K/uL  RBC Count : 3.10 M/uL  Hemoglobin : 7.4 g/dL  Hematocrit : 24.8 %  Platelet Count - Automated : 275 K/uL  Mean Cell Volume : 80.0 fl  Mean Cell Hemoglobin : 23.9 pg  Mean Cell Hemoglobin Concentration : 29.8 gm/dL  Auto Neutrophil # : x  Auto Lymphocyte # : x  Auto Monocyte # : x  Auto Eosinophil # : x  Auto Basophil # : x  Auto Neutrophil % : x  Auto Lymphocyte % : x  Auto Monocyte % : x  Auto Eosinophil % : x  Auto Basophil % : x    09-14    141  |  106  |  17  ----------------------------<  114<H>  4.3   |  23  |  1.05    Ca    8.9      14 Sep 2021 08:07    TPro  5.8<L>  /  Alb  3.0<L>  /  TBili  0.3  /  DBili  x   /  AST  10  /  ALT  13  /  AlkPhos  79  09-14    LIVER FUNCTIONS - ( 14 Sep 2021 08:07 )  Alb: 3.0 g/dL / Pro: 5.8 g/dL / ALK PHOS: 79 U/L / ALT: 13 U/L / AST: 10 U/L / GGT: x               MICROBIOLOGY:  .Surgical Swab Other  09-11-21   Rare Enterobacter cloacae complex  Few Coag Negative Staphylococcus "Susceptibilities not performed"  --  Enterobacter cloacae complex      Clean Catch Clean Catch (Midstream)  09-10-21   >=3 organisms. Probable collection contamination.  --  --      .Blood Blood  09-10-21   No growth to date.  --  --              v    Rapid RVP Result: Jocelyn (09-10 @ 17:14)          RADIOLOGY

## 2021-09-15 ENCOUNTER — TRANSCRIPTION ENCOUNTER (OUTPATIENT)
Age: 81
End: 2021-09-15

## 2021-09-15 LAB
ANION GAP SERPL CALC-SCNC: 11 MMOL/L — SIGNIFICANT CHANGE UP (ref 5–17)
APTT BLD: 197.1 SEC — CRITICAL HIGH (ref 27.5–35.5)
APTT BLD: 37.1 SEC — HIGH (ref 27.5–35.5)
APTT BLD: 57 SEC — HIGH (ref 27.5–35.5)
BUN SERPL-MCNC: 18 MG/DL — SIGNIFICANT CHANGE UP (ref 7–23)
CALCIUM SERPL-MCNC: 8.9 MG/DL — SIGNIFICANT CHANGE UP (ref 8.4–10.5)
CHLORIDE SERPL-SCNC: 109 MMOL/L — HIGH (ref 96–108)
CO2 SERPL-SCNC: 21 MMOL/L — LOW (ref 22–31)
CREAT SERPL-MCNC: 1.14 MG/DL — SIGNIFICANT CHANGE UP (ref 0.5–1.3)
CULTURE RESULTS: SIGNIFICANT CHANGE UP
CULTURE RESULTS: SIGNIFICANT CHANGE UP
GLUCOSE SERPL-MCNC: 117 MG/DL — HIGH (ref 70–99)
HCT VFR BLD CALC: 23.5 % — LOW (ref 39–50)
HGB BLD-MCNC: 7.1 G/DL — LOW (ref 13–17)
MCHC RBC-ENTMCNC: 24.3 PG — LOW (ref 27–34)
MCHC RBC-ENTMCNC: 30.2 GM/DL — LOW (ref 32–36)
MCV RBC AUTO: 80.5 FL — SIGNIFICANT CHANGE UP (ref 80–100)
NRBC # BLD: 0 /100 WBCS — SIGNIFICANT CHANGE UP (ref 0–0)
PLATELET # BLD AUTO: 253 K/UL — SIGNIFICANT CHANGE UP (ref 150–400)
POTASSIUM SERPL-MCNC: 3.5 MMOL/L — SIGNIFICANT CHANGE UP (ref 3.5–5.3)
POTASSIUM SERPL-SCNC: 3.5 MMOL/L — SIGNIFICANT CHANGE UP (ref 3.5–5.3)
RBC # BLD: 2.92 M/UL — LOW (ref 4.2–5.8)
RBC # FLD: 19.3 % — HIGH (ref 10.3–14.5)
SODIUM SERPL-SCNC: 141 MMOL/L — SIGNIFICANT CHANGE UP (ref 135–145)
SPECIMEN SOURCE: SIGNIFICANT CHANGE UP
SPECIMEN SOURCE: SIGNIFICANT CHANGE UP
WBC # BLD: 6.5 K/UL — SIGNIFICANT CHANGE UP (ref 3.8–10.5)
WBC # FLD AUTO: 6.5 K/UL — SIGNIFICANT CHANGE UP (ref 3.8–10.5)

## 2021-09-15 PROCEDURE — 99231 SBSQ HOSP IP/OBS SF/LOW 25: CPT

## 2021-09-15 PROCEDURE — 99232 SBSQ HOSP IP/OBS MODERATE 35: CPT

## 2021-09-15 RX ORDER — HEPARIN SODIUM 5000 [USP'U]/ML
800 INJECTION INTRAVENOUS; SUBCUTANEOUS
Qty: 25000 | Refills: 0 | Status: DISCONTINUED | OUTPATIENT
Start: 2021-09-15 | End: 2021-09-16

## 2021-09-15 RX ORDER — HEPARIN SODIUM 5000 [USP'U]/ML
2500 INJECTION INTRAVENOUS; SUBCUTANEOUS EVERY 6 HOURS
Refills: 0 | Status: DISCONTINUED | OUTPATIENT
Start: 2021-09-15 | End: 2021-09-16

## 2021-09-15 RX ORDER — HEPARIN SODIUM 5000 [USP'U]/ML
5500 INJECTION INTRAVENOUS; SUBCUTANEOUS EVERY 6 HOURS
Refills: 0 | Status: DISCONTINUED | OUTPATIENT
Start: 2021-09-15 | End: 2021-09-16

## 2021-09-15 RX ADMIN — PIPERACILLIN AND TAZOBACTAM 25 GRAM(S): 4; .5 INJECTION, POWDER, LYOPHILIZED, FOR SOLUTION INTRAVENOUS at 05:17

## 2021-09-15 RX ADMIN — HEPARIN SODIUM 1200 UNIT(S)/HR: 5000 INJECTION INTRAVENOUS; SUBCUTANEOUS at 15:59

## 2021-09-15 RX ADMIN — PIPERACILLIN AND TAZOBACTAM 25 GRAM(S): 4; .5 INJECTION, POWDER, LYOPHILIZED, FOR SOLUTION INTRAVENOUS at 22:13

## 2021-09-15 RX ADMIN — PIPERACILLIN AND TAZOBACTAM 25 GRAM(S): 4; .5 INJECTION, POWDER, LYOPHILIZED, FOR SOLUTION INTRAVENOUS at 13:43

## 2021-09-15 RX ADMIN — Medication 30 MILLIGRAM(S): at 13:42

## 2021-09-15 RX ADMIN — HEPARIN SODIUM 900 UNIT(S)/HR: 5000 INJECTION INTRAVENOUS; SUBCUTANEOUS at 09:00

## 2021-09-15 RX ADMIN — Medication 30 MILLIGRAM(S): at 22:14

## 2021-09-15 RX ADMIN — Medication 3 MILLIGRAM(S): at 23:52

## 2021-09-15 RX ADMIN — HEPARIN SODIUM 800 UNIT(S)/HR: 5000 INJECTION INTRAVENOUS; SUBCUTANEOUS at 00:47

## 2021-09-15 RX ADMIN — HEPARIN SODIUM 0 UNIT(S)/HR: 5000 INJECTION INTRAVENOUS; SUBCUTANEOUS at 23:01

## 2021-09-15 RX ADMIN — HEPARIN SODIUM 2500 UNIT(S): 5000 INJECTION INTRAVENOUS; SUBCUTANEOUS at 09:06

## 2021-09-15 RX ADMIN — HEPARIN SODIUM 5500 UNIT(S): 5000 INJECTION INTRAVENOUS; SUBCUTANEOUS at 16:03

## 2021-09-15 NOTE — PROGRESS NOTE ADULT - PROBLEM SELECTOR PLAN 6
Start home dose of metoprolol and diltiazem with hold precautions.  BP is WNL.  Hold hctz.

## 2021-09-15 NOTE — PROGRESS NOTE ADULT - SUBJECTIVE AND OBJECTIVE BOX
Subjective  S/p IR placement of abdominal drain yesterday evening, drained 45cc initially (sent for culture) and 140cc overnight. Patient states he feels well and offers no complaints.    Objective    Vital signs  T(F): , Max: 99.9 (09-14-21 @ 17:05)  HR: 64 (09-15-21 @ 05:26)  BP: 106/66 (09-15-21 @ 05:26)  SpO2: 99% (09-15-21 @ 05:26)  Wt(kg): --    Output     OUT:    Drain (mL): 140 mL    Urostomy (mL): 625 mL  Total OUT: 765 mL    Total NET: -765 mL        General: NAD, well-nourished  Abd: suprapubic IR drain with serosanguinous drainage  : +ileal conduit draining clear yellow    Labs      09-15 @ 07:37    WBC 6.50  / Hct 23.5  / SCr 1.14     09-14 @ 08:07    WBC 8.74  / Hct 24.8  / SCr 1.05         Culture - Surgical Swab (collected 09-11-21 @ 01:17)  Source: .Surgical Swab Other  Preliminary Report (09-11-21 @ 23:01):    Rare Enterobacter cloacae complex    Few Coag Negative Staphylococcus "Susceptibilities not performed"  Organism: Enterobacter cloacae complex (09-12-21 @ 19:30)  Organism: Enterobacter cloacae complex (09-12-21 @ 19:30)      -  Amikacin: S <=16      -  Amoxicillin/Clavulanic Acid: R >16/8      -  Ampicillin: R >16 These ampicillin results predict results for amoxicillin      -  Ampicillin/Sulbactam: R >16/8 Enterobacter, Citrobacter, and Serratia may develop resistance during prolonged therapy (3-4 days)      -  Aztreonam: S <=4      -  Cefazolin: R >16 Enterobacter, Citrobacter, and Serratia may develop resistance during prolonged therapy (3-4 days)      -  Cefepime: S <=2      -  Cefoxitin: R >16      -  Ceftriaxone: S <=1 Enterobacter, Citrobacter, and Serratia may develop resistance during prolonged therapy      -  Ciprofloxacin: S <=0.25      -  Ertapenem: S <=0.5      -  Gentamicin: S <=2      -  Imipenem: S <=1      -  Levofloxacin: S <=0.5      -  Meropenem: S <=1      -  Piperacillin/Tazobactam: S <=8      -  Tobramycin: S <=2      -  Trimethoprim/Sulfamethoxazole: S <=0.5/9.5      Method Type: ANA    Culture - Urine (collected 09-10-21 @ 21:13)  Source: Clean Catch Clean Catch (Midstream)  Final Report (09-12-21 @ 10:19):    >=3 organisms. Probable collection contamination.    Culture - Blood (collected 09-10-21 @ 21:00)  Source: .Blood Blood  Preliminary Report (09-11-21 @ 22:01):    No growth to date.    Culture - Blood (collected 09-10-21 @ 21:00)  Source: .Blood Blood  Preliminary Report (09-11-21 @ 22:01):    No growth to date.

## 2021-09-15 NOTE — PROGRESS NOTE ADULT - PROBLEM SELECTOR PLAN 5
Patient denies CP or palpitations at this time.   Cardiology progress note from [facility] indicates that the patient has undergone cardioversion x 3 in the past, then reverted back to atrial fibrillation.   He has a CHADSVASC score of 3 based on age and htn.   Maintain  K4, Mg 2  Hold for now xarelto pending IR evaluation. start heparin full AC with no bolus for now   C/w metoprolol and diltiazem.
Patient denies CP or palpitations at this time.   Cardiology progress note from [facility] indicates that the patient has undergone cardioversion x 3 in the past, then reverted back to atrial fibrillation.   He has a CHADSVASC score of 3 based on age and htn.   Maintain  K4, Mg 2  Hold for now xarelto pending IR evaluation.  C/w metoprolol and diltiazem.
Patient denies CP or palpitations at this time.   Cardiology progress note from [facility] indicates that the patient has undergone cardioversion x 3 in the past, then reverted back to atrial fibrillation.   He has a CHADSVASC score of 3 based on age and htn.   Maintain  K4, Mg 2  Hold for now xarelto pending IR evaluation. start heparin full AC with no bolus for now   C/w metoprolol and diltiazem.
Patient denies CP or palpitations at this time.   Cardiology progress note from [facility] indicates that the patient has undergone cardioversion x 3 in the past, then reverted back to atrial fibrillation.   He has a CHADSVASC score of 3 based on age and htn.   Maintain  K4, Mg 2  Hold for now xarelto pending IR evaluation. start heparin full AC with no bolus for now   C/w metoprolol and diltiazem.
Patient denies CP or palpitations at this time.   Cardiology progress note from [facility] indicates that the patient has undergone cardioversion x 3 in the past, then reverted back to atrial fibrillation.   He has a CHADSVASC score of 3 based on age and htn.   Maintain  K4, Mg 2  Hold for now xarelto pending IR evaluation.  C/w metoprolol and diltiazem.

## 2021-09-15 NOTE — PROGRESS NOTE ADULT - SUBJECTIVE AND OBJECTIVE BOX
Patient is a 81y old  Male who presents with a chief complaint of wound infection (15 Sep 2021 09:55)    Patient feeling well. Drain placed yesterday in abdominal abscess/fluid collection.    MEDICATIONS  (STANDING):  diltiazem    Tablet 30 milliGRAM(s) Oral three times a day  hemorrhoidal Ointment 1 Application(s) Rectal two times a day  heparin  Infusion. 800 Unit(s)/Hr (8 mL/Hr) IV Continuous <Continuous>  influenza   Vaccine 0.5 milliLiter(s) IntraMuscular once  metoprolol succinate  milliGRAM(s) Oral daily  piperacillin/tazobactam IVPB.. 3.375 Gram(s) IV Intermittent every 8 hours  sodium chloride 0.9%. 1000 milliLiter(s) (75 mL/Hr) IV Continuous <Continuous>    MEDICATIONS  (PRN):  acetaminophen   Tablet .. 650 milliGRAM(s) Oral every 6 hours PRN Temp greater or equal to 38.5C (101.3F), Mild Pain (1 - 3)  heparin   Injectable 5500 Unit(s) IV Push every 6 hours PRN For aPTT less than 40  heparin   Injectable 2500 Unit(s) IV Push every 6 hours PRN For aPTT between 40 - 57  melatonin 3 milliGRAM(s) Oral at bedtime PRN Insomnia      ROS  No fever, sweats, chills  No epistaxis, HA, sore throat  No CP, SOB, cough, sputum  No n/v/d, abd pain, melena, hematochezia  No edema  No rash  No anxiety  No back pain, joint pain  No bleeding, bruising  No dysuria, hematuria    Vital Signs Last 24 Hrs  T(C): 36.6 (15 Sep 2021 10:45), Max: 37.7 (14 Sep 2021 17:05)  T(F): 97.8 (15 Sep 2021 10:45), Max: 99.9 (14 Sep 2021 17:05)  HR: 86 (15 Sep 2021 10:45) (64 - 86)  BP: 126/65 (15 Sep 2021 10:45) (106/66 - 126/65)  BP(mean): 82 (14 Sep 2021 18:05) (82 - 85)  RR: 18 (15 Sep 2021 10:45) (16 - 20)  SpO2: 99% (15 Sep 2021 10:45) (95% - 100%)    PE  NAD  Awake, alert  Anicteric, MMM  Stable bilateral pedal edema  No rash grossly                            7.1    6.50  )-----------( 253      ( 15 Sep 2021 07:37 )             23.5       09-15    141  |  109<H>  |  18  ----------------------------<  117<H>  3.5   |  21<L>  |  1.14    Ca    8.9      15 Sep 2021 07:37    TPro  5.8<L>  /  Alb  3.0<L>  /  TBili  0.3  /  DBili  x   /  AST  10  /  ALT  13  /  AlkPhos  79  09-14      EXAM:  IR PROCEDURE NON PICC                                PROCEDURE DATE:  09/14/2021          INTERPRETATION:  Clinical history: 81-year-old male with pelvic collection referred for drainage.    Attending: Dr. Chanel Goncalves  Resident: Dr. Syed Luna    Anesthesia: Intravenous sedation was provided by the attending anesthesiologist.    Complications: None    Radiation dose: 717 DLP    EBL: Minimal    Procedure:  The risks benefits and alternatives to the procedure were discussed with the patient and informed consent obtained. Following informed consent the patient was placed in the supine position on the CT table and the lower abdomen prepped and draped in a sterile fashion. Physiologic monitoring was performed throughout the procedure. Intravenous sedation was administered by the attending anesthesiologist. 1% lidocaine was administered for local anesthesia.    Under intermittent CT guidance the  pelvic fluid collection was accessed with a 5 Ecuadorean drainage needle and catheter from a midline approach. Subsequently a 0.035 inch wire was coiled into the collection. Subsequently the tract was dilated to 10 Ecuadorean over an Amplatz wire and a 10.2 Ecuadorean multipurpose drainage catheter placed into the fluid collection. 45 ccs of purulent yellowish fluid was recovered and sent for microbiological evaluation.  The catheter was secured to the skin with 0 nylon suture and secured to bulb drainage. A sterile dressing was applied. The patient tolerated the procedure well and left the department in satisfactory condition. There were no immediate complications.    Findings: Axial images obtained were for guidance purposes only. There is a predominantly right-sided pelvic collection which was accessed under CT guidance as described above.    Following catheter placement the collection was nearly completely drained.   Permanent CT images of the needle accessing the collection and catheter within the collection were obtained.    Impression: Successful CT-guided drainage of a pelvic collection with placement of 10.2 Ecuadorean drainage catheter as described above.    --- End of Report ---

## 2021-09-15 NOTE — PROGRESS NOTE ADULT - PROBLEM SELECTOR PLAN 7
VTE ppx:  heparin   Activity: OOB with assistance, fall precautions
VTE ppx:  hold xarelto pending IR evaluation  Activity: OOB with assistance, fall precautions
VTE ppx:  heparin   Activity: OOB with assistance, fall precautions
VTE ppx:  hold xarelto pending IR evaluation  Activity: OOB with assistance, fall precautions
VTE ppx:  heparin   Activity: OOB with assistance, fall precautions            Dr Munoz will be covering me tomorrow the 16th

## 2021-09-15 NOTE — DISCHARGE NOTE PROVIDER - CARE PROVIDER_API CALL
Berkley Kam)  [facility]  [address] [place] [id number]  Phone: [phone]  Fax: [phone]  Follow Up Time:

## 2021-09-15 NOTE — CHART NOTE - NSCHARTNOTEFT_GEN_A_CORE
MEDICINE PA     EVENT SUMMARY   Discussed with IR fellow on call; okay to restart heparin gtt 6 hours post procedure. Drain with serosanguinous output. Heparin restarted at 8cc/hr. Will continue to monitor for any bleeding or sam blood. RN made aware.     Lindsey LOGAN  #[id number]

## 2021-09-15 NOTE — PROGRESS NOTE ADULT - SUBJECTIVE AND OBJECTIVE BOX
Name of Patient : RYAN FLOYD  MRN: [id number]  Date of visit: 09-15-21 @ 09:55      Subjective: Patient seen and examined. No new events except as noted.  S/P I R draianage  resume AC on heparin      REVIEW OF SYSTEMS:    CONSTITUTIONAL: No weakness, fevers or chills  EYES/ENT: No visual changes;  No vertigo or throat pain   NECK: No pain or stiffness  RESPIRATORY: No cough, wheezing, hemoptysis; No shortness of breath  CARDIOVASCULAR: No chest pain or palpitations  GASTROINTESTINAL: No abdominal or epigastric pain. No nausea, vomiting, or hematemesis; No diarrhea or constipation. No melena or hematochezia.  GENITOURINARY: No dysuria, frequency or hematuria  NEUROLOGICAL: No numbness or weakness  SKIN: No itching, burning, rashes, or lesions   All other review of systems is negative unless indicated above.    MEDICATIONS:  MEDICATIONS  (STANDING):  diltiazem    Tablet 30 milliGRAM(s) Oral three times a day  hemorrhoidal Ointment 1 Application(s) Rectal two times a day  heparin  Infusion. 800 Unit(s)/Hr (8 mL/Hr) IV Continuous <Continuous>  influenza   Vaccine 0.5 milliLiter(s) IntraMuscular once  metoprolol succinate  milliGRAM(s) Oral daily  piperacillin/tazobactam IVPB.. 3.375 Gram(s) IV Intermittent every 8 hours  sodium chloride 0.9%. 1000 milliLiter(s) (75 mL/Hr) IV Continuous <Continuous>      PHYSICAL EXAM:  T(C): 36.4 (09-15-21 @ 05:26), Max: 37.7 (09-14-21 @ 17:05)  HR: 64 (09-15-21 @ 05:26) (64 - 80)  BP: 106/66 (09-15-21 @ 05:26) (106/66 - 125/65)  RR: 18 (09-15-21 @ 05:26) (16 - 20)  SpO2: 99% (09-15-21 @ 05:26) (95% - 100%)  Wt(kg): --  I&O's Summary    14 Sep 2021 07:01  -  15 Sep 2021 07:00  --------------------------------------------------------  IN: 1476 mL / OUT: 765 mL / NET: 711 mL    15 Sep 2021 07:01  -  15 Sep 2021 09:55  --------------------------------------------------------  IN: 368 mL / OUT: 370 mL / NET: -2 mL      Height (cm): 165.1 (09-14 @ 15:20)  Weight (kg): 70.3 (09-14 @ 15:20)  BMI (kg/m2): 25.8 (09-14 @ 15:20)  BSA (m2): 1.77 (09-14 @ 15:20)    Appearance: Normal	  HEENT:  PERRLA   Lymphatic: No lymphadenopathy   Cardiovascular: Normal S1 S2, no JVD  Respiratory: normal effort , clear  Gastrointestinal:  Soft, Non-tender  Skin: No rashes,  warm to touch  Psychiatry:  Mood & affect appropriate  Musculuskeletal: No edema      All labs, Imaging and EKGs personally reviewed       09-14-21 @ 07:01  -  09-15-21 @ 07:00  --------------------------------------------------------  IN: 1476 mL / OUT: 765 mL / NET: 711 mL    09-15-21 @ 07:01  -  09-15-21 @ 09:55  --------------------------------------------------------  IN: 368 mL / OUT: 370 mL / NET: -2 mL                          7.1    6.50  )-----------( 253      ( 15 Sep 2021 07:37 )             23.5               09-15    141  |  109<H>  |  18  ----------------------------<  117<H>  3.5   |  21<L>  |  1.14    Ca    8.9      15 Sep 2021 07:37    TPro  5.8<L>  /  Alb  3.0<L>  /  TBili  0.3  /  DBili  x   /  AST  10  /  ALT  13  /  AlkPhos  79  09-14    PT/INR - ( 13 Sep 2021 17:19 )   PT: 15.7 sec;   INR: 1.33 ratio         PTT - ( 15 Sep 2021 07:38 )  PTT:57.0 sec

## 2021-09-15 NOTE — PROGRESS NOTE ADULT - PROBLEM SELECTOR PLAN 4
Follow up oncology team recommendations.  Patient to continue his care with Dr. Smith at [facility].     Anemia- Hgb remains stable from labs from 9/3/2021 from [facility].  Will monitor CBC.
Follow up oncology team recommendations.  Patient to continue his care with Dr. Smith at [facility].     Anemia- Hgb remains stable from labs from 9/3/2021 from [facility].  Will monitor CBC.
Follow up oncology team recommendations.  Patient to continue his care with Dr. Smith at [facility].     Anemia- Hgb remains stable from labs from 9/3/2021 from [facility].  Will monitor CBC.
Follow up oncology team recommendations.  Patient to continue his care with Dr. Smith at [facility].     Anemia- Hgb remains stable from labs from 9/3/2021 from [facility].  Will monitor CBC.
Follow up oncology team recommendations.  Patient to [place] [facility]     Anemia- Hgb remains stable from labs from 9/3/2021 from [facility].  Will monitor CBC.

## 2021-09-15 NOTE — DISCHARGE NOTE PROVIDER - NSDCCPCAREPLAN_GEN_ALL_CORE_FT
PRINCIPAL DISCHARGE DIAGNOSIS  Diagnosis: Pelvic fluid collection  Assessment and Plan of Treatment:       SECONDARY DISCHARGE DIAGNOSES  Diagnosis: Hypertension  Assessment and Plan of Treatment:     Diagnosis: Acute UTI  Assessment and Plan of Treatment:     Diagnosis: Chronic atrial fibrillation  Assessment and Plan of Treatment:     Diagnosis: Urothelial cancer  Assessment and Plan of Treatment:      PRINCIPAL DISCHARGE DIAGNOSIS  Diagnosis: Pelvic fluid collection  Assessment and Plan of Treatment: Infected wound, Pelvic fluid collection  --WBC normalized to 8.23 , Afebrile  --Suspected infected fluid collection/abscess which is communicating with the wound  --Wound culture positive for enterobacter cloacae complex  - was on  IV Zosyn  --S/P successful drainage of pelvic collection with placement of 10.2 Syriac drainage catheter . f/u IR drain Cx result------        SECONDARY DISCHARGE DIAGNOSES  Diagnosis: Hypertension  Assessment and Plan of Treatment: Low salt diet  Activity as tolerated.  Take all medication as prescribed.  Follow up with your medical doctor for routine blood pressure monitoring at your next visit.  Notify your doctor if you have any of the following symptoms:   Dizziness, Lightheadedness, Blurry vision, Headache, Chest pain, Shortness of breath      Diagnosis: Acute UTI  Assessment and Plan of Treatment:   --many bacteria. large leukocytes in urine  --Culture positive for multiple organisms (?contaminated)  -- seen by ID, continued on Zosyn  --Ultrasound - no hydronephrosis      Diagnosis: Chronic atrial fibrillation  Assessment and Plan of Treatment: Atrial fibrillation is the most common heart rhythm problem & has the risk of stroke & heart attack  It helps if you control your blood pressure, not drink more than 1-2 alcohol drinks per day, cut down on caffeine, getting treatment for over active thyroid gland, & getting exercise  Call your doctor if you feel your heart racing or beating unusually, chest tightness or pain, lightheaded, faint, shortness of breath especially with exercise  It is important to take your heart medication as prescribed  You may be on anticoagulation which is very important to take as directed - you may need blood work to monitor drug levels      Diagnosis: Urothelial cancer  Assessment and Plan of Treatment: Follow up at [facility] with your oncologist

## 2021-09-15 NOTE — PHYSICAL THERAPY INITIAL EVALUATION ADULT - ADDITIONAL COMMENTS
As per pt, pt lives in a private house w/ sons and 2 steps to enter w/ UHR. PTA pt was independent w/ all mobility w/ straight cane and ADLs.

## 2021-09-15 NOTE — DISCHARGE NOTE PROVIDER - NSDCMRMEDTOKEN_GEN_ALL_CORE_FT
dilTIAZem 30 mg oral tablet: 1 tab(s) orally 3 times a day  hydroCHLOROthiazide 25 mg oral tablet: 1 tab(s) orally once a day  Metoprolol Succinate  mg oral tablet, extended release: 1 tab(s) orally once a day  potassium chloride 20 mEq oral tablet, extended release: 1 tab(s) orally once a day  Tylenol 325 mg oral tablet: 2 tab(s) orally every 6 hours, As Needed  Xarelto 20 mg oral tablet: 1 tab(s) orally once a day (in the evening)   dilTIAZem 30 mg oral tablet: 1 tab(s) orally 3 times a day  Flagyl 500 mg oral tablet: 1 tab(s) orally 2 times a day   levoFLOXacin 500 mg oral tablet: 1 tab(s) orally once a day   Metoprolol Succinate  mg oral tablet, extended release: 1 tab(s) orally once a day  Tylenol 325 mg oral tablet: 2 tab(s) orally every 6 hours, As Needed  Xarelto 20 mg oral tablet: 1 tab(s) orally once a day (in the evening)

## 2021-09-15 NOTE — PROGRESS NOTE ADULT - SUBJECTIVE AND OBJECTIVE BOX
Interventional Radiology Follow-Up Note.     Patient seen and examined @ bedside between 7 am and 7:30am.     This is a 81y Male s/p drainage of pelvic collection on 9/14 in Interventional Radiology with Dr. goncalves.    Denies pelvic site pain.       Medication:     diltiazem    Tablet: (09-14)  heparin   Injectable: (09-13)  heparin  Infusion.: (09-15)  heparin  Infusion.: (09-14)  metoprolol succinate ER: (09-14)  piperacillin/tazobactam IVPB..: (09-15)    Vitals:   T(F): 97.8, Max: 99.9 (17:05)  HR: 86  BP: 126/65  RR: 18  SpO2: 99%    Physical Exam:  General: Nontoxic, in NAD.  Abdomen: soft, NTND.   Drain Device: Drain intact attached to SYLWIA bulb. Drain with purulent red out put.  Flushed with 5cc NS w/o difficulty. Dressing changed surgical site incision with purulent discharge from the     24hr Drain output: 140cc    LABS:  Aspartate Aminotransferase (AST/SGOT): 10 U/L (09-14-21 @ 08:07)  Alanine Aminotransferase (ALT/SGPT): 13 U/L (09-14-21 @ 08:07)        LABS:  Na: 141 (09-15 @ 07:37), 141 (09-14 @ 08:07), 141 (09-13 @ 06:22)  K: 3.5 (09-15 @ 07:37), 4.3 (09-14 @ 08:07), 3.5 (09-13 @ 06:22)  Cl: 109 (09-15 @ 07:37), 106 (09-14 @ 08:07), 109 (09-13 @ 06:22)  CO2: 21 (09-15 @ 07:37), 23 (09-14 @ 08:07), 22 (09-13 @ 06:22)  BUN: 18 (09-15 @ 07:37), 17 (09-14 @ 08:07), 20 (09-13 @ 06:22)  Cr: 1.14 (09-15 @ 07:37), 1.05 (09-14 @ 08:07), 1.15 (09-13 @ 06:22)  Glu: 117(09-15 @ 07:37), 114(09-14 @ 08:07), 110(09-13 @ 06:22)    Hgb: 7.1 (09-15 @ 07:37), 7.4 (09-14 @ 08:07), 7.2 (09-14 @ 00:19), 7.9 (09-13 @ 09:37), 6.9 (09-13 @ 06:22)  Hct: 23.5 (09-15 @ 07:37), 24.8 (09-14 @ 08:07), 24.1 (09-14 @ 00:19), 26.6 (09-13 @ 09:37), 23.1 (09-13 @ 06:22)  WBC: 6.50 (09-15 @ 07:37), 8.74 (09-14 @ 08:07), 7.48 (09-14 @ 00:19), 6.81 (09-13 @ 09:37), 6.44 (09-13 @ 06:22)  Plt: 253 (09-15 @ 07:37), 275 (09-14 @ 08:07), 277 (09-14 @ 00:19), 289 (09-13 @ 09:37), 272 (09-13 @ 06:22)    INR: 1.33 09-13-21 @ 17:19  PTT: 57.0 09-15-21 @ 07:38, 159.4 09-14-21 @ 08:07, 144.9 09-14-21 @ 00:19, 34.6 09-13-21 @ 17:19          LIVER FUNCTIONS - ( 14 Sep 2021 08:07 )  Alb: 3.0 g/dL / Pro: 5.8 g/dL / ALK PHOS: 79 U/L / ALT: 13 U/L / AST: 10 U/L / GGT: x             Culture - Surgical Swab (09.11.21 @ 01:17)    -  Amikacin: S <=16    -  Amoxicillin/Clavulanic Acid: R >16/8    -  Ampicillin: R >16 These ampicillin results predict results for amoxicillin    -  Ampicillin/Sulbactam: R >16/8 Enterobacter, Citrobacter, and Serratia may develop resistance during prolonged therapy (3-4 days)    -  Aztreonam: S <=4    -  Cefazolin: R >16 Enterobacter, Citrobacter, and Serratia may develop resistance during prolonged therapy (3-4 days)    -  Cefepime: S <=2    -  Cefoxitin: R >16    -  Ceftriaxone: S <=1 Enterobacter, Citrobacter, and Serratia may develop resistance during prolonged therapy    -  Ciprofloxacin: S <=0.25    -  Ertapenem: S <=0.5    -  Gentamicin: S <=2    -  Imipenem: S <=1    -  Levofloxacin: S <=0.5    -  Meropenem: S <=1    -  Piperacillin/Tazobactam: S <=8    -  Tobramycin: S <=2    -  Trimethoprim/Sulfamethoxazole: S <=0.5/9.5    Specimen Source: .Surgical Swab Other    Culture Results:   Rare Enterobacter cloacae complex  Few Coag Negative Staphylococcus "Susceptibilities not performed"    Organism Identification: Enterobacter cloacae complex    Organism: Enterobacter cloacae complex    Method Type: ANA       Assessment/Plan: 81y Male with hx of 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 Franklin Swann) for suspected wound infection. Urology consulted for Curvilinear pelvic fluid collection extending from the cystectomy bed and along the pelvic sidewalls s/p drainage of fluid collection.     - on IV zosyn   - Flush drain as ordered; DO NOT aspirate  - Change dressing q3 days or when dressing is saturated.  -  Monitor h/h; transfuse as needed  - Trend vs/labs  - Continue global management per primary team  - If the patient is d/c home with drainage catheter, pt can make an appointment with IR by calling the IR booking office at [phone]; recommend IR follow in 1 week.   - Pt will benefit from VNS service to help with drainage catheter care; Pt should continue same drainage catheter care as an outpatient.   - D/w Dr. Goncalves.    Please call IR at  [phone] with any questions, concerns, or issues regarding above.    Also available on Teams.

## 2021-09-15 NOTE — DISCHARGE NOTE PROVIDER - HOSPITAL COURSE
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 Franklin Swann) for suspected wound infection. Urology consulted for Curvilinear pelvic fluid collection extending from the cystectomy bed and along the pelvic sidewalls.     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 Franklin Swann) for suspected wound infection. Urology consulted for Curvilinear pelvic fluid collection extending from the cystectomy bed and along the pelvic sidewalls.      Infected wound, Pelvic fluid collection  --WBC normalized to 8.23 , Afebrile  --Suspected infected fluid collection/abscess which is communicating with the wound  --Wound culture positive for enterobacter cloacae complex  -- started on IV Zosyn  --S/P successful drainage of pelvic collection with placement of 10.2 Yoruba drainage catheter   --ID has seen patient - recommending changing antibiotics to PO Levaquin and flagyl when patient ready for DC    Anemia  --Likely related to underlying malignancy with active infection (acute on chronic)    UTI  --many bacteria. 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 Franklin Swann) for suspected wound infection. Urology consulted for Curvilinear pelvic fluid collection extending from the cystectomy bed and along the pelvic sidewalls.      Infected wound, Pelvic fluid collection  --WBC normalized to 8.23 , Afebrile  --Suspected infected fluid collection/abscess which is communicating with the wound  --Wound culture positive for enterobacter cloacae complex  -- started on IV Zosyn  --S/P successful drainage of pelvic collection with placement of 10.2 Maori drainage catheter   --ID has seen patient - recommending changing antibiotics to PO Levaquin and flagyl when patient ready for DC    Anemia  --Likely related to underlying malignancy with active infection (acute on chronic)    UTI  --many bacteria. large leukocytes  --Culture positive for multiple organisms (?contaminated)  --zosyn/vanco given in ED  --Ultrasound - no hydronephrosis  --Patient continuing Zosyn  --Will transition to PO antibiotics per above on discharge  Patient cleared by Dr Munoz for discharge home with  home care.

## 2021-09-15 NOTE — PHYSICAL THERAPY INITIAL EVALUATION ADULT - PERTINENT HX OF CURRENT PROBLEM, REHAB EVAL
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 Now s/p IR drainage/aspiration

## 2021-09-15 NOTE — DISCHARGE NOTE PROVIDER - NSDCFUADDAPPT_GEN_ALL_CORE_FT
> Follow up with Interventional Radiology dept within 1 week of discharge. You can make an appointment with IR by calling the IR booking office at [phone].  > Follow up with oncologist and PMD with 3 days of discharge.  > Take all antibiotics as ordered.   > Follow up with Interventional Radiology dept within 1 week of discharge. You can make an appointment with IR by calling the IR booking office at [phone].  > Follow up with oncologist [facility] (Dr. Franklin Swann) and PMD with 3 days of discharge.  > Take all antibiotics as ordered.  > Follow drain culture results.  > Follow up urology outpatient with 5 days of discharge.

## 2021-09-15 NOTE — PROGRESS NOTE ADULT - SUBJECTIVE AND OBJECTIVE BOX
RYAN FLOYD 81y [id number]    Patient is a 81y old  Male who presents with a chief complaint of wound infection (15 Sep 2021 13:33)      Follow Up/CC:  ID following for pelvic collection    Interval History/ROS: no fever, s/p IR drain 9/14    Allergies    No Known Allergies    Intolerances        ANTIMICROBIALS:  piperacillin/tazobactam IVPB.. 3.375 every 8 hours      MEDICATIONS  (STANDING):  diltiazem    Tablet 30 milliGRAM(s) Oral three times a day  hemorrhoidal Ointment 1 Application(s) Rectal two times a day  heparin  Infusion. 800 Unit(s)/Hr (8 mL/Hr) IV Continuous <Continuous>  influenza   Vaccine 0.5 milliLiter(s) IntraMuscular once  metoprolol succinate  milliGRAM(s) Oral daily  piperacillin/tazobactam IVPB.. 3.375 Gram(s) IV Intermittent every 8 hours  sodium chloride 0.9%. 1000 milliLiter(s) (75 mL/Hr) IV Continuous <Continuous>    MEDICATIONS  (PRN):  acetaminophen   Tablet .. 650 milliGRAM(s) Oral every 6 hours PRN Temp greater or equal to 38.5C (101.3F), Mild Pain (1 - 3)  heparin   Injectable 5500 Unit(s) IV Push every 6 hours PRN For aPTT less than 40  heparin   Injectable 2500 Unit(s) IV Push every 6 hours PRN For aPTT between 40 - 57  melatonin 3 milliGRAM(s) Oral at bedtime PRN Insomnia        Vital Signs Last 24 Hrs  T(C): 37.2 (15 Sep 2021 13:39), Max: 37.7 (14 Sep 2021 17:05)  T(F): 98.9 (15 Sep 2021 13:39), Max: 99.9 (14 Sep 2021 17:05)  HR: 69 (15 Sep 2021 13:39) (64 - 86)  BP: 133/66 (15 Sep 2021 13:39) (106/66 - 133/66)  BP(mean): 82 (14 Sep 2021 18:05) (82 - 85)  RR: 18 (15 Sep 2021 13:39) (16 - 20)  SpO2: 98% (15 Sep 2021 13:39) (95% - 100%)    CBC Full  -  ( 15 Sep 2021 07:37 )  WBC Count : 6.50 K/uL  RBC Count : 2.92 M/uL  Hemoglobin : 7.1 g/dL  Hematocrit : 23.5 %  Platelet Count - Automated : 253 K/uL  Mean Cell Volume : 80.5 fl  Mean Cell Hemoglobin : 24.3 pg  Mean Cell Hemoglobin Concentration : 30.2 gm/dL  Auto Neutrophil # : x  Auto Lymphocyte # : x  Auto Monocyte # : x  Auto Eosinophil # : x  Auto Basophil # : x  Auto Neutrophil % : x  Auto Lymphocyte % : x  Auto Monocyte % : x  Auto Eosinophil % : x  Auto Basophil % : x    09-15    141  |  109<H>  |  18  ----------------------------<  117<H>  3.5   |  21<L>  |  1.14    Ca    8.9      15 Sep 2021 07:37    TPro  5.8<L>  /  Alb  3.0<L>  /  TBili  0.3  /  DBili  x   /  AST  10  /  ALT  13  /  AlkPhos  79  09-14    LIVER FUNCTIONS - ( 14 Sep 2021 08:07 )  Alb: 3.0 g/dL / Pro: 5.8 g/dL / ALK PHOS: 79 U/L / ALT: 13 U/L / AST: 10 U/L / GGT: x               MICROBIOLOGY:  .Surgical Swab Other  09-11-21   Rare Enterobacter cloacae complex  Few Coag Negative Staphylococcus "Susceptibilities not performed"  --  Enterobacter cloacae complex      Clean Catch Clean Catch (Midstream)  09-10-21   >=3 organisms. Probable collection contamination.  --  --      .Blood Blood  09-10-21   No growth to date.  --  --      Rapid RVP Result: Jocelyn (09-10 @ 17:14)          RADIOLOGY

## 2021-09-16 ENCOUNTER — TRANSCRIPTION ENCOUNTER (OUTPATIENT)
Age: 81
End: 2021-09-16

## 2021-09-16 VITALS
SYSTOLIC BLOOD PRESSURE: 112 MMHG | HEART RATE: 68 BPM | OXYGEN SATURATION: 100 % | TEMPERATURE: 98 F | DIASTOLIC BLOOD PRESSURE: 65 MMHG | RESPIRATION RATE: 18 BRPM

## 2021-09-16 LAB
APTT BLD: 76.4 SEC — HIGH (ref 27.5–35.5)
APTT BLD: 88.3 SEC — HIGH (ref 27.5–35.5)
HCT VFR BLD CALC: 23.8 % — LOW (ref 39–50)
HGB BLD-MCNC: 7.1 G/DL — LOW (ref 13–17)
MCHC RBC-ENTMCNC: 23.7 PG — LOW (ref 27–34)
MCHC RBC-ENTMCNC: 29.8 GM/DL — LOW (ref 32–36)
MCV RBC AUTO: 79.6 FL — LOW (ref 80–100)
NRBC # BLD: 0 /100 WBCS — SIGNIFICANT CHANGE UP (ref 0–0)
PLATELET # BLD AUTO: 261 K/UL — SIGNIFICANT CHANGE UP (ref 150–400)
RBC # BLD: 2.99 M/UL — LOW (ref 4.2–5.8)
RBC # FLD: 19.6 % — HIGH (ref 10.3–14.5)
WBC # BLD: 8.21 K/UL — SIGNIFICANT CHANGE UP (ref 3.8–10.5)
WBC # FLD AUTO: 8.21 K/UL — SIGNIFICANT CHANGE UP (ref 3.8–10.5)

## 2021-09-16 PROCEDURE — 86901 BLOOD TYPING SEROLOGIC RH(D): CPT

## 2021-09-16 PROCEDURE — 86850 RBC ANTIBODY SCREEN: CPT

## 2021-09-16 PROCEDURE — 85610 PROTHROMBIN TIME: CPT

## 2021-09-16 PROCEDURE — 87186 SC STD MICRODIL/AGAR DIL: CPT

## 2021-09-16 PROCEDURE — 83010 ASSAY OF HAPTOGLOBIN QUANT: CPT

## 2021-09-16 PROCEDURE — 85018 HEMOGLOBIN: CPT

## 2021-09-16 PROCEDURE — 80053 COMPREHEN METABOLIC PANEL: CPT

## 2021-09-16 PROCEDURE — 84132 ASSAY OF SERUM POTASSIUM: CPT

## 2021-09-16 PROCEDURE — 87640 STAPH A DNA AMP PROBE: CPT

## 2021-09-16 PROCEDURE — 87077 CULTURE AEROBIC IDENTIFY: CPT

## 2021-09-16 PROCEDURE — C1729: CPT

## 2021-09-16 PROCEDURE — 85730 THROMBOPLASTIN TIME PARTIAL: CPT

## 2021-09-16 PROCEDURE — C1769: CPT

## 2021-09-16 PROCEDURE — 87641 MR-STAPH DNA AMP PROBE: CPT

## 2021-09-16 PROCEDURE — 82607 VITAMIN B-12: CPT

## 2021-09-16 PROCEDURE — 96365 THER/PROPH/DIAG IV INF INIT: CPT

## 2021-09-16 PROCEDURE — 82330 ASSAY OF CALCIUM: CPT

## 2021-09-16 PROCEDURE — 82947 ASSAY GLUCOSE BLOOD QUANT: CPT

## 2021-09-16 PROCEDURE — 99232 SBSQ HOSP IP/OBS MODERATE 35: CPT

## 2021-09-16 PROCEDURE — 99285 EMERGENCY DEPT VISIT HI MDM: CPT

## 2021-09-16 PROCEDURE — 82728 ASSAY OF FERRITIN: CPT

## 2021-09-16 PROCEDURE — 87075 CULTR BACTERIA EXCEPT BLOOD: CPT

## 2021-09-16 PROCEDURE — 82746 ASSAY OF FOLIC ACID SERUM: CPT

## 2021-09-16 PROCEDURE — 99231 SBSQ HOSP IP/OBS SF/LOW 25: CPT

## 2021-09-16 PROCEDURE — 87086 URINE CULTURE/COLONY COUNT: CPT

## 2021-09-16 PROCEDURE — 87205 SMEAR GRAM STAIN: CPT

## 2021-09-16 PROCEDURE — 82435 ASSAY OF BLOOD CHLORIDE: CPT

## 2021-09-16 PROCEDURE — 83615 LACTATE (LD) (LDH) ENZYME: CPT

## 2021-09-16 PROCEDURE — 84295 ASSAY OF SERUM SODIUM: CPT

## 2021-09-16 PROCEDURE — 86900 BLOOD TYPING SEROLOGIC ABO: CPT

## 2021-09-16 PROCEDURE — 36415 COLL VENOUS BLD VENIPUNCTURE: CPT

## 2021-09-16 PROCEDURE — 0225U NFCT DS DNA&RNA 21 SARSCOV2: CPT

## 2021-09-16 PROCEDURE — 87040 BLOOD CULTURE FOR BACTERIA: CPT

## 2021-09-16 PROCEDURE — 85014 HEMATOCRIT: CPT

## 2021-09-16 PROCEDURE — 85027 COMPLETE CBC AUTOMATED: CPT

## 2021-09-16 PROCEDURE — 97161 PT EVAL LOW COMPLEX 20 MIN: CPT

## 2021-09-16 PROCEDURE — 96375 TX/PRO/DX INJ NEW DRUG ADDON: CPT

## 2021-09-16 PROCEDURE — 74177 CT ABD & PELVIS W/CONTRAST: CPT | Mod: MA

## 2021-09-16 PROCEDURE — 83605 ASSAY OF LACTIC ACID: CPT

## 2021-09-16 PROCEDURE — 81001 URINALYSIS AUTO W/SCOPE: CPT

## 2021-09-16 PROCEDURE — 83550 IRON BINDING TEST: CPT

## 2021-09-16 PROCEDURE — 80048 BASIC METABOLIC PNL TOTAL CA: CPT

## 2021-09-16 PROCEDURE — 87070 CULTURE OTHR SPECIMN AEROBIC: CPT

## 2021-09-16 PROCEDURE — 83540 ASSAY OF IRON: CPT

## 2021-09-16 PROCEDURE — 82803 BLOOD GASES ANY COMBINATION: CPT

## 2021-09-16 PROCEDURE — 85025 COMPLETE CBC W/AUTO DIFF WBC: CPT

## 2021-09-16 PROCEDURE — 49406 IMAGE CATH FLUID PERI/RETRO: CPT

## 2021-09-16 RX ORDER — METRONIDAZOLE 500 MG
1 TABLET ORAL
Qty: 14 | Refills: 0
Start: 2021-09-16 | End: 2021-09-22

## 2021-09-16 RX ORDER — POTASSIUM CHLORIDE 20 MEQ
1 PACKET (EA) ORAL
Qty: 0 | Refills: 0 | DISCHARGE

## 2021-09-16 RX ADMIN — HEPARIN SODIUM 1000 UNIT(S)/HR: 5000 INJECTION INTRAVENOUS; SUBCUTANEOUS at 08:08

## 2021-09-16 RX ADMIN — PIPERACILLIN AND TAZOBACTAM 25 GRAM(S): 4; .5 INJECTION, POWDER, LYOPHILIZED, FOR SOLUTION INTRAVENOUS at 05:57

## 2021-09-16 RX ADMIN — Medication 30 MILLIGRAM(S): at 05:56

## 2021-09-16 RX ADMIN — PHENYLEPHRINE-SHARK LIVER OIL-MINERAL OIL-PETROLATUM RECTAL OINTMENT 1 APPLICATION(S): at 05:58

## 2021-09-16 RX ADMIN — Medication 30 MILLIGRAM(S): at 14:42

## 2021-09-16 RX ADMIN — Medication 100 MILLIGRAM(S): at 05:56

## 2021-09-16 RX ADMIN — HEPARIN SODIUM 1000 UNIT(S)/HR: 5000 INJECTION INTRAVENOUS; SUBCUTANEOUS at 00:01

## 2021-09-16 RX ADMIN — PIPERACILLIN AND TAZOBACTAM 25 GRAM(S): 4; .5 INJECTION, POWDER, LYOPHILIZED, FOR SOLUTION INTRAVENOUS at 14:36

## 2021-09-16 NOTE — PROGRESS NOTE ADULT - PROBLEM SELECTOR PROBLEM 3
Pelvic fluid collection
Citrobacter infection
Pelvic fluid collection
Pelvic fluid collection
Citrobacter infection
Citrobacter infection

## 2021-09-16 NOTE — PROGRESS NOTE ADULT - PROBLEM SELECTOR PLAN 3
-cont abx
CT revealed a 13.1x 7.6 cm x 9cm curvilinear pelvic fluid collection extending from the cystectomy bed to the pelvic side walls, with extensive inflammatory change at the anterior most aspect of the collection, seemingly adherent to the anterior abdominal wall. Surgical drain is adjacent but not within the collection.   This collection was not noted on his CT report from [facility] from 88/7/2021.  Patient does not have tenderness to palpation of abdomen, nausea, vomiting, fever at this time.  F/U urology team and IR team to determine best plan of care for this collection.
CT revealed a 13.1x 7.6 cm x 9cm curvilinear pelvic fluid collection extending from the cystectomy bed to the pelvic side walls, with extensive inflammatory change at the anterior most aspect of the collection, seemingly adherent to the anterior abdominal wall. Surgical drain is adjacent but not within the collection.   This collection was not noted on his CT report from [facility] from 88/7/2021.  Patient does not have tenderness to palpation of abdomen, nausea, vomiting, fever at this time.  F/U urology team and IR team to determine best plan of care for this collection.
CT revealed a 13.1x 7.6 cm x 9cm curvilinear pelvic fluid collection extending from the cystectomy bed to the pelvic side walls, with extensive inflammatory change at the anterior most aspect of the collection, seemingly adherent to the anterior abdominal wall. Surgical drain is adjacent but not within the collection.   This collection was not noted on his CT report from [facility] from 88/7/2021.  Patient does not have tenderness to palpation of abdomen, nausea, vomiting, fever at this time.  F/U urology team and IR team to determine best plan of care for this collection.
CT revealed a 13.1x 7.6 cm x 9cm curvilinear pelvic fluid collection extending from the cystectomy bed to the pelvic side walls, with extensive inflammatory change at the anterior most aspect of the collection, seemingly adherent to the anterior abdominal wall. Surgical drain is adjacent but not within the collection.   This collection was not noted on his CT report from [facility] from 88/7/2021.  Patient does not have tenderness to palpation of abdomen, nausea, vomiting, fever at this time.  F/U urology team
CT revealed a 13.1x 7.6 cm x 9cm curvilinear pelvic fluid collection extending from the cystectomy bed to the pelvic side walls, with extensive inflammatory change at the anterior most aspect of the collection, seemingly adherent to the anterior abdominal wall. Surgical drain is adjacent but not within the collection.   This collection was not noted on his CT report from [facility] from 88/7/2021.  Patient does not have tenderness to palpation of abdomen, nausea, vomiting, fever at this time.  F/U urology team and IR team to determine best plan of care for this collection.
-cont abx
-cont abx

## 2021-09-16 NOTE — PROGRESS NOTE ADULT - PROBLEM SELECTOR PLAN 2
UA is grossly positive.  Will continue zosyn for now.  Follow up urine and blood cultures.  Monitor VS q4h.  Tylenol PRN for fever.    mild elevation in BIUN/Cr  start IV hydration  monitor renal function
UA is grossly positive.  Will continue zosyn for now.  Follow up urine and blood cultures.  Monitor VS q4h.  Tylenol PRN for fever.    mild elevation in BIUN/Cr  start IV hydration  monitor renal function
UA is grossly positive.  Will continue zosyn for now.  Follow up urine and blood cultures.  Monitor VS q4h.  Tylenol PRN for fever.
-better  -abx as above
UA is grossly positive.  Will continue zosyn for now.  Follow up urine and blood cultures.  Monitor VS q4h.  Tylenol PRN for fever.    mild elevation in BIUN/Cr  start IV hydration  monitor renal function
UA is grossly positive.  Will continue zosyn for now.  Follow up urine and blood cultures.  Monitor VS q4h.  Tylenol PRN for fever.    mild elevation in BIUN/Cr  start IV hydration  monitor renal function
-better  -abx as above
-better  -abx as above

## 2021-09-16 NOTE — PROGRESS NOTE ADULT - PROVIDER SPECIALTY LIST ADULT
Heme/Onc
Internal Medicine
Intervent Radiology
Urology
Urology
Heme/Onc
Infectious Disease
Intervent Radiology
Urology
Urology
Heme/Onc
Internal Medicine
Infectious Disease
Infectious Disease

## 2021-09-16 NOTE — PROGRESS NOTE ADULT - SUBJECTIVE AND OBJECTIVE BOX
Interventional Radiology Follow-Up Note.     Patient seen and examined @ bedside between 7am and 7:30am.    This is a 81y Male s/p drainage of pelvic collection on 9/14 in Interventional Radiology with Dr. goncalves.          Medication:     diltiazem    Tablet: (09-16)  heparin  Infusion.: (09-16)  metoprolol succinate ER: (09-16)  piperacillin/tazobactam IVPB..: (09-16)    Vitals:   T(F): 97.8, Max: 98.9 (13:39)  HR: 76  BP: 129/73  RR: 17  SpO2: 99%    Physical Exam:  General: Nontoxic, in NAD.  Abdomen: soft, NTND.   Drain Device: Drain intact attached to SYLWIA bulb. Drain with purulent red out put.  Clearer compare to yesterday. Flushed with 5cc NS w/o difficulty. Dressing changed. Surgical site incision with SS discharge.     24hr Drain output: 620cc.          LABS:  Na: 141 (09-15 @ 07:37), 141 (09-14 @ 08:07)  K: 3.5 (09-15 @ 07:37), 4.3 (09-14 @ 08:07)  Cl: 109 (09-15 @ 07:37), 106 (09-14 @ 08:07)  CO2: 21 (09-15 @ 07:37), 23 (09-14 @ 08:07)  BUN: 18 (09-15 @ 07:37), 17 (09-14 @ 08:07)  Cr: 1.14 (09-15 @ 07:37), 1.05 (09-14 @ 08:07)  Glu: 117(09-15 @ 07:37), 114(09-14 @ 08:07)    Hgb: 7.1 (09-16 @ 07:11), 7.1 (09-15 @ 07:37), 7.4 (09-14 @ 08:07), 7.2 (09-14 @ 00:19), 7.9 (09-13 @ 09:37)  Hct: 23.8 (09-16 @ 07:11), 23.5 (09-15 @ 07:37), 24.8 (09-14 @ 08:07), 24.1 (09-14 @ 00:19), 26.6 (09-13 @ 09:37)  WBC: 8.21 (09-16 @ 07:11), 6.50 (09-15 @ 07:37), 8.74 (09-14 @ 08:07), 7.48 (09-14 @ 00:19), 6.81 (09-13 @ 09:37)  Plt: 261 (09-16 @ 07:11), 253 (09-15 @ 07:37), 275 (09-14 @ 08:07), 277 (09-14 @ 00:19), 289 (09-13 @ 09:37)    INR: 1.33 09-13-21 @ 17:19  PTT: 76.4 09-16-21 @ 07:11, 197.1 09-15-21 @ 22:16, 37.1 09-15-21 @ 14:56, 57.0 09-15-21 @ 07:38, 159.4 09-14-21 @ 08:07, 144.9 09-14-21 @ 00:19, 34.6 09-13-21 @ 17:19                Preliminary Report:    Few Enterobacter cloacae complex          Assessment/Plan: 81y Male with hx of 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 Franklin Swann) for suspected wound infection. Urology consulted for Curvilinear pelvic fluid collection extending from the cystectomy bed and along the pelvic sidewalls s/p drainage of fluid collection.     - on IV zosyn   - Flush drain as ordered; DO NOT aspirate  - Change dressing q3 days or when dressing is saturated.  -  Monitor h/h; transfuse as needed  - Trend vs/labs  - Continue global management per primary team  - If the patient is d/c home with drainage catheter, pt can make an appointment with IR by calling the IR booking office at [phone]; recommend IR follow in 1 week.   - Pt will benefit from VNS service to help with drainage catheter care; Pt should continue same drainage catheter care as an outpatient.   - D/w Dr. Goncalves.      Please call IR at  [phone] with any questions, concerns, or issues regarding above.    Also available on Teams.

## 2021-09-16 NOTE — PROGRESS NOTE ADULT - SUBJECTIVE AND OBJECTIVE BOX
RYAN FLOYD 81y [id number]    Patient is a 81y old  Male who presents with a chief complaint of wound infection (16 Sep 2021 09:25)      Follow Up/CC:  ID following for    Interval History/ROS:    Allergies    No Known Allergies    Intolerances        ANTIMICROBIALS:  piperacillin/tazobactam IVPB.. 3.375 every 8 hours      MEDICATIONS  (STANDING):  diltiazem    Tablet 30 milliGRAM(s) Oral three times a day  hemorrhoidal Ointment 1 Application(s) Rectal two times a day  heparin  Infusion. 800 Unit(s)/Hr (8 mL/Hr) IV Continuous <Continuous>  influenza   Vaccine 0.5 milliLiter(s) IntraMuscular once  metoprolol succinate  milliGRAM(s) Oral daily  piperacillin/tazobactam IVPB.. 3.375 Gram(s) IV Intermittent every 8 hours  sodium chloride 0.9%. 1000 milliLiter(s) (75 mL/Hr) IV Continuous <Continuous>    MEDICATIONS  (PRN):  acetaminophen   Tablet .. 650 milliGRAM(s) Oral every 6 hours PRN Temp greater or equal to 38.5C (101.3F), Mild Pain (1 - 3)  heparin   Injectable 5500 Unit(s) IV Push every 6 hours PRN For aPTT less than 40  heparin   Injectable 2500 Unit(s) IV Push every 6 hours PRN For aPTT between 40 - 57  melatonin 3 milliGRAM(s) Oral at bedtime PRN Insomnia        Vital Signs Last 24 Hrs  T(C): 36.6 (16 Sep 2021 10:38), Max: 37.2 (15 Sep 2021 13:39)  T(F): 97.9 (16 Sep 2021 10:38), Max: 98.9 (15 Sep 2021 13:39)  HR: 56 (16 Sep 2021 10:38) (56 - 88)  BP: 94/57 (16 Sep 2021 10:38) (94/57 - 133/66)  BP(mean): --  RR: 18 (16 Sep 2021 10:38) (17 - 18)  SpO2: 100% (16 Sep 2021 10:38) (98% - 100%)    CBC Full  -  ( 16 Sep 2021 07:11 )  WBC Count : 8.21 K/uL  RBC Count : 2.99 M/uL  Hemoglobin : 7.1 g/dL  Hematocrit : 23.8 %  Platelet Count - Automated : 261 K/uL  Mean Cell Volume : 79.6 fl  Mean Cell Hemoglobin : 23.7 pg  Mean Cell Hemoglobin Concentration : 29.8 gm/dL  Auto Neutrophil # : x  Auto Lymphocyte # : x  Auto Monocyte # : x  Auto Eosinophil # : x  Auto Basophil # : x  Auto Neutrophil % : x  Auto Lymphocyte % : x  Auto Monocyte % : x  Auto Eosinophil % : x  Auto Basophil % : x    09-15    141  |  109<H>  |  18  ----------------------------<  117<H>  3.5   |  21<L>  |  1.14    Ca    8.9      15 Sep 2021 07:37            MICROBIOLOGY:  .Abscess Pelvic Abscess  09-14-21   Few Enterobacter cloacae complex  --  --      .Surgical Swab Other  09-11-21   Rare Enterobacter cloacae complex  Few Coag Negative Staphylococcus "Susceptibilities not performed"  --  Enterobacter cloacae complex      Clean Catch Clean Catch (Midstream)  09-10-21   >=3 organisms. Probable collection contamination.  --  --      .Blood Blood  09-10-21   No Growth Final  --  --              v    Rapid RVP Result: NotDetec (09-10 @ 17:14)          RADIOLOGY     RYAN FLOYD 81y [id number]    Patient is a 81y old  Male who presents with a chief complaint of wound infection (16 Sep 2021 09:25)      Follow Up/CC:  ID following for pelvic abscess    Interval History/ROS: no fever, feels ok    Allergies    No Known Allergies    Intolerances        ANTIMICROBIALS:  piperacillin/tazobactam IVPB.. 3.375 every 8 hours      MEDICATIONS  (STANDING):  diltiazem    Tablet 30 milliGRAM(s) Oral three times a day  hemorrhoidal Ointment 1 Application(s) Rectal two times a day  heparin  Infusion. 800 Unit(s)/Hr (8 mL/Hr) IV Continuous <Continuous>  influenza   Vaccine 0.5 milliLiter(s) IntraMuscular once  metoprolol succinate  milliGRAM(s) Oral daily  piperacillin/tazobactam IVPB.. 3.375 Gram(s) IV Intermittent every 8 hours  sodium chloride 0.9%. 1000 milliLiter(s) (75 mL/Hr) IV Continuous <Continuous>    MEDICATIONS  (PRN):  acetaminophen   Tablet .. 650 milliGRAM(s) Oral every 6 hours PRN Temp greater or equal to 38.5C (101.3F), Mild Pain (1 - 3)  heparin   Injectable 5500 Unit(s) IV Push every 6 hours PRN For aPTT less than 40  heparin   Injectable 2500 Unit(s) IV Push every 6 hours PRN For aPTT between 40 - 57  melatonin 3 milliGRAM(s) Oral at bedtime PRN Insomnia        Vital Signs Last 24 Hrs  T(C): 36.6 (16 Sep 2021 10:38), Max: 37.2 (15 Sep 2021 13:39)  T(F): 97.9 (16 Sep 2021 10:38), Max: 98.9 (15 Sep 2021 13:39)  HR: 56 (16 Sep 2021 10:38) (56 - 88)  BP: 94/57 (16 Sep 2021 10:38) (94/57 - 133/66)  BP(mean): --  RR: 18 (16 Sep 2021 10:38) (17 - 18)  SpO2: 100% (16 Sep 2021 10:38) (98% - 100%)    CBC Full  -  ( 16 Sep 2021 07:11 )  WBC Count : 8.21 K/uL  RBC Count : 2.99 M/uL  Hemoglobin : 7.1 g/dL  Hematocrit : 23.8 %  Platelet Count - Automated : 261 K/uL  Mean Cell Volume : 79.6 fl  Mean Cell Hemoglobin : 23.7 pg  Mean Cell Hemoglobin Concentration : 29.8 gm/dL  Auto Neutrophil # : x  Auto Lymphocyte # : x  Auto Monocyte # : x  Auto Eosinophil # : x  Auto Basophil # : x  Auto Neutrophil % : x  Auto Lymphocyte % : x  Auto Monocyte % : x  Auto Eosinophil % : x  Auto Basophil % : x    09-15    141  |  109<H>  |  18  ----------------------------<  117<H>  3.5   |  21<L>  |  1.14    Ca    8.9      15 Sep 2021 07:37            MICROBIOLOGY:  .Abscess Pelvic Abscess  09-14-21   Few Enterobacter cloacae complex  --  --      .Surgical Swab Other  09-11-21   Rare Enterobacter cloacae complex  Few Coag Negative Staphylococcus "Susceptibilities not performed"  --  Enterobacter cloacae complex      Clean Catch Clean Catch (Midstream)  09-10-21   >=3 organisms. Probable collection contamination.  --  --      .Blood Blood  09-10-21   No Growth Final  --  --        Rapid RVP Result: Nainatec (09-10 @ 17:14)      RADIOLOGY

## 2021-09-16 NOTE — PROGRESS NOTE ADULT - NEUROLOGICAL DETAILS
alert and oriented x 3/responds to verbal commands
alert and oriented x 3/responds to verbal commands/normal strength
alert and oriented x 3/responds to verbal commands

## 2021-09-16 NOTE — DISCHARGE NOTE NURSING/CASE MANAGEMENT/SOCIAL WORK - PATIENT PORTAL LINK FT
You can access the FollowMyHealth Patient Portal offered by [facility] by registering at the following website: [email or web address]. By joining [facility] [employer]’s FollowMyHealth portal, you will also be able to view your health information using other applications (apps) compatible with our system.

## 2021-09-16 NOTE — PROGRESS NOTE ADULT - PROBLEM SELECTOR PLAN 1
Pt has mild purulence, erythema and swelling at pelvic incision site.  Urology eval appreciated   Check MRSA swab. Patient was given vancomycin and zosyn in the ED.  Will continue zosyn for now (also covers UTI). Will continue vancomycin if MRSA positive.   Follow up urine and blood cultures.  Monitor VS q4h.  IR johann appreciated, possible intervention on monday , monitor clinically
Pt has mild purulence, erythema and swelling at pelvic incision site.  Urology eval appreciated   Check MRSA swab. Patient was given vancomycin and zosyn in the ED.  Will continue zosyn for now (also covers UTI).    Follow up urine and blood cultures.  Monitor VS q4h.  IR johann appreciated, no intervention for now, cont to monitor
Pt has mild purulence, erythema and swelling at pelvic incision site.  Urology johann appreciated   Check MRSA swab. Patient was given vancomycin and zosyn in the ED.  Will continue zosyn for now (also covers UTI).    Follow up urine and blood cultures.  Monitor VS q4h.  BRYSON wills appreciated, plan for draianage today
Pt has mild purulence, erythema and swelling at pelvic incision site.  Urology johann appreciated   Check MRSA swab. Patient was given vancomycin and zosyn in the ED.  Will continue zosyn for now (also covers UTI).    Follow up urine and blood cultures.  Monitor VS q4h.  IR johann appreciated, S/P BRYSON diaz, follow up
Pt has mild purulence, erythema and swelling at pelvic incision site.  Urology eval appreciated   Check MRSA swab. Patient was given vancomycin and zosyn in the ED.  Will continue zosyn for now (also covers UTI). Will continue vancomycin if MRSA positive.   Follow up urine and blood cultures.  Monitor VS q4h.  IR johann appreciated, possible intervention on monday , monitor clinically
-cont zosyn  -stable  -no fever or leukocytosis   -levofloxacin and flagyl on dc   -s/p drainage by IR - f/u fluid cx
-cont zosyn  -stable  -no fever or leukocytosis   -levofloxacin and flagyl on dc   - f/u  -no plans for drainage by IR
-cont zosyn  -stable  -no fever or leukocytosis   -levofloxacin and flagyl on dc - levofloxacin 500 mg po daily + flagyl 500 mg po BID x 7 days  -s/p drainage by IR -  fluid cx with enterobacter also  -potential side effects of FQs/flagyl explained including GI, Cdiff, tendinitis, resistance issues, development of allergies, metallic taste, etc.  -outpt drain f/u with IR

## 2021-09-16 NOTE — PROGRESS NOTE ADULT - REASON FOR ADMISSION
wound infection

## 2021-09-16 NOTE — PROGRESS NOTE ADULT - SUBJECTIVE AND OBJECTIVE BOX
Subjective  Overall, feels well. States drainage seems to be the same from abdominal site as prior to drain insertion. No issues with drain overnight.    Objective    Vital signs  T(F): , Max: 98.9 (09-15-21 @ 13:39)  HR: 76 (09-16-21 @ 05:55)  BP: 129/73 (09-16-21 @ 05:55)  SpO2: 99% (09-16-21 @ 05:55)  Wt(kg): --    Output     OUT:    Drain (mL): 620 mL    Urostomy (mL): 1750 mL  Total OUT: 2370 mL    Total NET: -2370 mL      OUT:    Drain (mL): 30 mL    Urostomy (mL): 300 mL  Total OUT: 330 mL    Total NET: -330 mL          General: NAD, well-nourished  Abd: suprapubic IR drain with serosanguinous drainage  : +ileal conduit draining clear yellow      Labs      09-16 @ 07:11    WBC 8.21  / Hct 23.8  / SCr --       09-15 @ 07:37    WBC 6.50  / Hct 23.5  / SCr 1.14         Culture - Abscess with Gram Stain (collected 09-14-21 @ 22:04)  Source: .Abscess Pelvic Abscess  Preliminary Report (09-15-21 @ 21:19):    Few Enterobacter cloacae complex    Culture - Surgical Swab (collected 09-11-21 @ 01:17)  Source: .Surgical Swab Other  Final Report (09-15-21 @ 16:50):    Rare Enterobacter cloacae complex    Few Coag Negative Staphylococcus "Susceptibilities not performed"  Organism: Enterobacter cloacae complex (09-15-21 @ 16:50)  Organism: Enterobacter cloacae complex (09-15-21 @ 16:50)      -  Amikacin: S <=16      -  Amoxicillin/Clavulanic Acid: R >16/8      -  Ampicillin: R >16 These ampicillin results predict results for amoxicillin      -  Ampicillin/Sulbactam: R >16/8 Enterobacter, Citrobacter, and Serratia may develop resistance during prolonged therapy (3-4 days)      -  Aztreonam: S <=4      -  Cefazolin: R >16 Enterobacter, Citrobacter, and Serratia may develop resistance during prolonged therapy (3-4 days)      -  Cefepime: S <=2      -  Cefoxitin: R >16      -  Ceftriaxone: S <=1 Enterobacter, Citrobacter, and Serratia may develop resistance during prolonged therapy      -  Ciprofloxacin: S <=0.25      -  Ertapenem: S <=0.5      -  Gentamicin: S <=2      -  Imipenem: S <=1      -  Levofloxacin: S <=0.5      -  Meropenem: S <=1      -  Piperacillin/Tazobactam: S <=8      -  Tobramycin: S <=2      -  Trimethoprim/Sulfamethoxazole: S <=0.5/9.5      Method Type: ANA    Culture - Urine (collected 09-10-21 @ 21:13)  Source: Clean Catch Clean Catch (Midstream)  Final Report (09-12-21 @ 10:19):    >=3 organisms. Probable collection contamination.    Culture - Blood (collected 09-10-21 @ 21:00)  Source: .Blood Blood  Final Report (09-15-21 @ 21:01):    No Growth Final    Culture - Blood (collected 09-10-21 @ 21:00)  Source: .Blood Blood  Final Report (09-15-21 @ 21:01):    No Growth Final        Urine Cx: ?  Blood Cx: ?    Imaging

## 2021-09-16 NOTE — PROGRESS NOTE ADULT - RS GEN PE MLT RESP DETAILS PC
respirations non-labored/clear to auscultation bilaterally/no wheezes

## 2021-09-16 NOTE — PROGRESS NOTE ADULT - GASTROINTESTINAL DETAILS
soft/nontender/no distention/no rebound tenderness/no guarding
soft/nontender/no distention/no rebound tenderness/no guarding
soft/nontender/no distention/no guarding/no rigidity

## 2021-09-16 NOTE — PROGRESS NOTE ADULT - ASSESSMENT
81y Male PMH of atrial fibrillation on xarelto, htn, rheumatic fever in childhood, AS s/p bioprosthetic AVR, high grade muscle invasive urothelial cancer s/p 2 sessions immunotherapy (ipilimumab/nivolumab) c/b autoimmune hepatitis, s/p robotic cystectomy, prostatectomy with enterostomy on 7/30/2021 who presents to the ED from [facility] (Dr. Franklin Swann) for suspected wound infection. Urology consulted for Curvilinear pelvic fluid collection extending from the cystectomy bed and along the pelvic sidewalls.      -S/p IR drainage/aspiration procedure today for midline fluid collection  -FU IR culture  -Strict Is and Os  -ABX per ID, appreciate recs  -Remainder of care per primary team 
81y Male PMH of atrial fibrillation on xarelto, htn, rheumatic fever in childhood, AS s/p bioprosthetic AVR, high grade muscle invasive urothelial cancer s/p 2 sessions immunotherapy (ipilimumab/nivolumab) c/b autoimmune hepatitis, s/p robotic cystectomy, prostatectomy with enterostomy on 7/30/2021 who presents to the ED from [facility] (Dr. Franklin Swann) for suspected wound infection. Urology consulted for Curvilinear pelvic fluid collection extending from the cystectomy bed and along the pelvic sidewalls.      -S/p IR drainage/aspiration procedure for midline fluid collection  -FU IR culture  -Strict Is and Os  -ABX per ID, appreciate recs  -Remainder of care per primary team  
Hematology/Oncology consulted on this 80 year old gentleman with history of high grade muscle invasive urothelial carcinoma, on IRB 18-42 [place] [facility] Patient received Nivo/ipi 5/25 and nivolumab on 6/15/21 per protocol. Immunotherapy was discontinued on IRB due to grade 3 autoimmune hepatitis. He is cisplatin ineligible due to hearing loss. He had a prednisone taper and is now off steroids. His repeat C/A/P with contrast on 7/8/21 showed mild right hydronephrosis. He is S/P robotic cystectomy, ( last month) prostatectomy with enterostomy by Dr Swann on 7/30/21. He had an appointment today with [facility] for toxicity check and hydration. He did not receive IV hydration today and was referred for evaluation of a infected suprapubic incision left side, with purulent drainage. He denies pain and fever.  He is currently undergoing treatment at [facility] with [place] Smith.   Of note, history of AVR/afib on xarelto and HTN.    High grade urothelial bladder cancer  --Undergoing [place] at [facility]  --will follow up with Dr Smith at [facility] after discharge    Infected wound, Pelvic fluid collection  --WBC normalized to 8.23 , Afebrile  --Suspected infected fluid collection/abscess which is communicating with the wound  --Wound culture positive for enterobacter cloacae complex  --On IV Zosyn  --Urology: No intervention  --IR evaluated patient - as patient afebrile and WBC improving, no intervention at this time but recommend observation adn AB    Anemia  --Likely related to underlying malignancy with active infection (acute on chronic)  --will trend curve  --Checking for treatable causes of anemia  --please transfuse for hgb <7.0    UTI  --many bacteria. large leukocytes  --Culture positive for multiple organisms (?contaminated)  --zosyn/vanco given in ED  --Patient continuing Zosyn      We will be happy to answer any onc questions on behalf of [facility] and will be in touch with them.    Zach Zee PA-C  Hematology/Oncology  [facility]   [phone] (cell)  [phone] (office)  [phone] (alt office)  Evenings and weekends please call MD on call or office  
Hematology/Oncology consulted on this 80 year old gentleman with history of high grade muscle invasive urothelial carcinoma, on IRB 18-42 protocol at [facility]. Patient received Nivo/ipi 5/25 and nivolumab on 6/15/21 per protocol. Immunotherapy was discontinued on IRB due to grade 3 autoimmune hepatitis. He is cisplatin ineligible due to hearing loss. He had a prednisone taper and is now off steroids. His repeat C/A/P with contrast on 7/8/21 showed mild right hydronephrosis. He is S/P robotic cystectomy, ( last month) prostatectomy with enterostomy by Dr Swann on 7/30/21. He had an appointment today with [facility] for toxicity check and hydration. He did not receive IV hydration today and was referred for evaluation of a infected suprapubic incision left side, with purulent drainage. He denies pain and fever.  He is currently undergoing treatment at [facility] with Dr Smith.   Of note, history of AVR/afib on xarelto and HTN.    High grade urothelial bladder cancer  --Undergoing treatment at [facility]  --will follow up with Dr Smith at [facility] after discharge    Infected wound, Pelvic fluid collection  --Suspected infected fluid collection/abscess which is communicating with the wound  --Wound culture positive for enterobacter cloacae complex  --On IV Zosyn  --S/P successful drainage of pelvic collection with placement of 10.2 Lebanese drainage catheter   --ID has seen patient - recommending changing antibiotics to PO Levaquin and flagyl on dc    Anemia  --Likely related to underlying malignancy with active infection (acute on chronic)  -- hgb stable and adequate  --please transfuse for hgb <7.0    UTI  --many bacteria. large leukocytes  --Culture positive for multiple organisms (?contaminated)  --zosyn/vanco given in ED  --Ultrasound - no hydronephrosis  --Patient continuing Zosyn  --Will transition to PO antibiotics per above on discharge    d/w pt, to d/c home today, f/u with [facility] after d/c, total time spent 35min, >50% spent in discussion and coordination of care. 
Hematology/Oncology consulted on this 80 year old gentleman with history of high grade muscle invasive urothelial carcinoma, [place]B 18-42 [place] [facility] received Nivo/ipi 5/25 and nivolumab on 6/15/21 per protocol. Immunotherapy was discontinued on IRB due to grade 3 autoimmune hepatitis. He is cisplatin ineligible due to hearing loss. He had a prednisone taper and is now off steroids. His repeat C/A/P with contrast on 7/8/21 showed mild right hydronephrosis. He is S/P robotic cystectomy, ( last month) prostatectomy with enterostomy by Dr Swann on 7/30/21. He had an appointment today with [facility] [place] and hydration. He did not receive IV hydration today and was referred for evaluation of a infected suprapubic incision left side, with purulent drainage. He denies pain and fever.  He is currently [place] [facility] [place].   Of note, history of AVR/afib on xarelto and HTN.    High grade urothelial bladder cancer  --[place] [facility]  --will follow up with Dr Smith at [facility] [place]    Infected wound, Pelvic fluid collection  --WBC normalized to 8.23 , Afebrile  --Suspected infected fluid collection/abscess which is communicating with the wound  --Wound culture positive for enterobacter cloacae complex  --On IV Zosyn  -- planning to perform drainage today and cinogram to evaluate communication between draining sinus and abdominal fluid collection with IR  --ID has seen patient - recommending changing antibiotics to PO levaquin and flagyl when patient ready for DC    Anemia  --Likely related to underlying malignancy with active infection (acute on chronic)  --will trend curve  --Checking for treatable causes of anemia - results pending  --please transfuse for hgb <7.0    UTI  --many bacteria. large leukocytes  --Culture positive for multiple organisms (?contaminated)  --zosyn/vanco given in ED  --Patient continuing Zosyn    We will be happy to answer any onc questions on behalf of [facility] and will be in touch with them.    Zach Zee PA-C  Hematology/Oncology  [facility]   [phone] (cell)  [phone] (office)  [phone] (alt office)  Evenings and weekends please call MD on call or office  
Hematology/Oncology consulted on this 80 year old gentleman with history of high grade muscle invasive urothelial carcinoma, on IRB 18-42 protocol at [facility]. Patient received Nivo/ipi 5/25 and nivolumab on 6/15/21 per protocol. Immunotherapy was discontinued on IRB due to grade 3 autoimmune hepatitis. He is cisplatin ineligible due to hearing loss. He had a prednisone taper and is now off steroids. His repeat C/A/P with contrast on 7/8/21 showed mild right hydronephrosis. He is S/P robotic cystectomy, ( last month) prostatectomy with enterostomy by Dr Swann on 7/30/21. He had an appointment today with [facility] for toxicity check and hydration. He did not receive IV hydration today and was referred for evaluation of a infected suprapubic incision left side, with purulent drainage. He denies pain and fever.  He is currently undergoing treatment at [facility] with Dr Smith.   Of note, history of AVR/afib on xarelto and HTN.      High grade urothelial bladder cancer  --Undergoing treatment at [facility]  --will follow up with Dr Smith at [facility] after discharge      Infected wound, Pelvic fluid collection  --WBC 12.8, Afebrile  --Suspected infected fluid collection/abscess which is communicating with the wound  --On IV ABx, continue  --Urology: No intervention  --IR: Rec continue ABx and re-eval on Monday for drainage      Anemia  --H&H 8.3/27.6, likely related to underlying malignancy  --will trend curve  --please transfuse for hgb <7.0    UTI  --many bacteria. large leukocytes  --Culture pending  --zosyn/vanco given in ED    Bilateral lower extremity edema  --Can diurese when labs result  --per primary      Patient will be seen daily while admitted and we will continue to coordinate with [facility]      Laney Bingham MD  Hematology/Oncology Consultant  [facility]  Cell: [phone]  
Hematology/Oncology consulted on this 80 year old gentleman with history of high grade muscle invasive urothelial carcinoma, on IRB 18-42 protocol at [facility]. Patient received Nivo/ipi 5/25 and nivolumab on 6/15/21 per protocol. Immunotherapy was discontinued on IRB due to grade 3 autoimmune hepatitis. He is cisplatin ineligible due to hearing loss. He had a prednisone taper and is now off steroids. His repeat C/A/P with contrast on 7/8/21 showed mild right hydronephrosis. He is S/P robotic cystectomy, ( last month) prostatectomy with enterostomy by Dr Swann on 7/30/21. He had an [place] today [place] [facility] [place] and [place]. He did not receive IV hydration today and was referred for evaluation of a infected suprapubic incision left side, with purulent drainage. He denies pain and fever.  He is currently undergoing treatment at [facility] with Dr Smith.   Of note, history of AVR/afib on xarelto and HTN.      High grade urothelial bladder cancer  --Undergoing treatment at [facility]  --will follow up with [place] Smith at [facility] [place]      Infected wound, Pelvic fluid collection  --WBC normalized to 8.23 , Afebrile  --Suspected infected fluid collection/abscess which is communicating with the wound  --On IV ABx, continue  --Urology: No intervention  --IR: Rec continue ABx and re-eval on Monday for drainage      Anemia  --H&H 7.4/24.3 likely related to underlying malignancy  --will trend curve  --please transfuse for hgb <7.0    UTI  --many bacteria. large leukocytes  --Culture pending  --zosyn/vanco given in ED      We will be happy to answer any onc questions on behalf of [facility] and will be in touch with them.    Esteban Chadwick MD  Hematology/Oncology  Weekends and nights please call [phone] for MD on call  Cell:  [phone]  Office Phone: [phone]  Office Fax:  [phone] [address] [place] [address] 
This patient is a 80yo gentleman with PMH of atrial fibrillation on xarelto, htn, rheumatic fever in childhood, AS s/p bioprosthetic cancer AVR, high grade muscle invasive urothelial cancer s/p 2 sessions immunotherapy (ipilimumab/nivolumab) c/b  autoimmune hepatitis, s/p robotic cystectomy, prostatectomy with enterostomy  on 7/30/2021 who presents to the ED from [facility] for suspected wound infection, found to also have UTI and large pelvic fluid collection.
81y Male PMH of atrial fibrillation on xarelto, htn, rheumatic fever in childhood, AS s/p bioprosthetic AVR, high grade muscle invasive urothelial cancer s/p 2 sessions immunotherapy (ipilimumab/nivolumab) c/b autoimmune hepatitis, s/p robotic cystectomy, prostatectomy with enterostomy on 7/30/2021 who presents to the ED from [facility] for suspected wound infection. Urology consulted for Curvilinear pelvic fluid collection extending from the cystectomy bed and along the pelvic sidewalls.  -no acute urologic intervention required  -incision may need to be opened in the future. however, would recommend IR drainage first as may be communicating    -potential IR drainage of fluid collection tmrw.   -management of care per primary team 
This patient is a 82yo gentleman with PMH of atrial fibrillation on xarelto, htn, rheumatic fever in childhood, AS s/p bioprosthetic cancer AVR, high grade muscle invasive urothelial cancer s/p 2 sessions immunotherapy (ipilimumab/nivolumab) c/b  autoimmune hepatitis, s/p robotic cystectomy, prostatectomy with enterostomy  on 7/30/2021 who presents to the ED from [facility] for suspected wound infection, found to also have UTI and large pelvic fluid collection.
81y Male PMH of atrial fibrillation on xarelto, htn, rheumatic fever in childhood, AS s/p bioprosthetic AVR, high grade muscle invasive urothelial cancer s/p 2 sessions immunotherapy (ipilimumab/nivolumab) c/b autoimmune hepatitis, s/p robotic cystectomy, prostatectomy with enterostomy on 7/30/2021 who presents to the ED from [facility] (Dr. Franklin Swann) for suspected wound infection. Urology consulted for Curvilinear pelvic fluid collection extending from the cystectomy bed and along the pelvic sidewalls.      -Awaiting IR drainage/aspiration procedure today for midline fluid collection  -Please ensure patient has proper care in hospital as per home routine for ileal conduit, states bag is changed daily at home; can also attach conduit to large an drainage bag so that it does not need to be empties as frequently  -Remainder of care per primary team 
Hematology/Oncology consulted on this 80 year old gentleman with history of high grade muscle invasive urothelial carcinoma, on IRB 18-42 [place] [facility] Patient received Nivo/ipi 5/25 and nivolumab on 6/15/21 per protocol. Immunotherapy was discontinued on IRB due to grade 3 autoimmune hepatitis. He is cisplatin ineligible due to hearing loss. He had a prednisone taper and is now off steroids. His repeat C/A/P with contrast on 7/8/21 showed mild right hydronephrosis. He is S/P robotic cystectomy, ( last month) prostatectomy with enterostomy by Dr Swann on 7/30/21. He had an appointment today with [facility] for toxicity check and hydration. He did not receive IV hydration today and was referred for evaluation of a infected suprapubic incision left side, with purulent drainage. He denies pain and fever.  He is currently undergoing treatment at [facility] [place] Smith.   Of note, history of AVR/afib on xarelto and HTN.    High grade urothelial bladder cancer  --Undergoing [place] [facility]  --will follow up with Dr Smith at [facility] after discharge    Infected wound, Pelvic fluid collection  --WBC normalized to 8.23 , Afebrile  --Suspected infected fluid collection/abscess which is communicating with the wound  --Wound culture positive for enterobacter cloacae complex  --On IV Zosyn  --S/P successful drainage of pelvic collection with placement of 10.2 French drainage catheter   --ID has seen patient - recommending changing antibiotics to PO Levaquin and flagyl when patient ready for DC    Anemia  --Likely related to underlying malignancy with active infection (acute on chronic)  --will trend curve  --Checking for treatable causes of anemia - results pending  --please transfuse for hgb <7.0    UTI  --many bacteria. large leukocytes  --Culture positive for multiple organisms (?contaminated)  --zosyn/vanco given in ED  --Ultrasound - no hydronephrosis  --Patient continuing Zosyn  --Will transition to PO antibiotics per above on discharge    We will be happy to answer any onc questions on behalf of [facility] and will be in touch with them.    Zach Zee PA-C  Hematology/Oncology  [facility]   [phone] (cell)  [phone] (office)  [phone] (alt office)  Evenings and weekends please call MD on call or office  
This patient is a 82yo gentleman with PMH of atrial fibrillation on xarelto, htn, rheumatic fever in childhood, AS s/p bioprosthetic cancer AVR, high grade muscle invasive urothelial cancer s/p 2 sessions immunotherapy (ipilimumab/nivolumab) c/b  autoimmune hepatitis, s/p robotic cystectomy, prostatectomy with enterostomy  on 7/30/2021 who presents to the ED from [facility] for suspected wound infection, found to also have UTI and large pelvic fluid collection.
This patient is a 80yo gentleman with PMH of atrial fibrillation on xarelto, htn, rheumatic fever in childhood, AS s/p bioprosthetic cancer AVR, high grade muscle invasive urothelial cancer s/p 2 sessions immunotherapy (ipilimumab/nivolumab) c/b  autoimmune hepatitis, s/p robotic cystectomy, prostatectomy with enterostomy  on 7/30/2021 who presents to the ED from [facility] for suspected wound infection, found to also have UTI and large pelvic fluid collection.
This patient is a 80yo gentleman with PMH of atrial fibrillation on xarelto, htn, rheumatic fever in childhood, AS s/p bioprosthetic cancer AVR, high grade muscle invasive urothelial cancer s/p 2 sessions immunotherapy (ipilimumab/nivolumab) c/b  autoimmune hepatitis, s/p robotic cystectomy, prostatectomy with enterostomy  on 7/30/2021 who presents to the ED from [facility] for suspected wound infection, found to also have UTI and large pelvic fluid collection.
80 y/o gentleman with PMH of atrial fibrillation on xarelto, htn, rheumatic fever in childhood, AS s/p bioprosthetic cancer AVR, high grade muscle invasive urothelial cancer s/p 2 sessions immunotherapy (ipilimumab/nivolumab) c/b  autoimmune hepatitis, s/p robotic cystectomy, prostatectomy with enterostomy  on 7/30/2021 who presents to the ED from [facility] for suspected wound infection with pelvic collection     Dain Bhatti  Attending Physician   Division of Infectious Disease  Pager #[phone]  Available on [employer] Teams also  After 5pm/weekend or no response, call #[phone]
82 y/o gentleman with PMH of atrial fibrillation on xarelto, htn, rheumatic fever in childhood, AS s/p bioprosthetic cancer AVR, high grade muscle invasive urothelial cancer s/p 2 sessions immunotherapy (ipilimumab/nivolumab) c/b  autoimmune hepatitis, s/p robotic cystectomy, prostatectomy with enterostomy  on 7/30/2021 who presents to the ED from [facility] for suspected wound infection with pelvic collection     Dain Bhatti  Attending Physician   Division of Infectious Disease  Pager #[phone]  Available on [employer] Teams also  After 5pm/weekend or no response, call #[phone]
80 y/o gentleman with PMH of atrial fibrillation on xarelto, htn, rheumatic fever in childhood, AS s/p bioprosthetic cancer AVR, high grade muscle invasive urothelial cancer s/p 2 sessions immunotherapy (ipilimumab/nivolumab) c/b  autoimmune hepatitis, s/p robotic cystectomy, prostatectomy with enterostomy  on 7/30/2021 who presents to the ED from [facility] for suspected wound infection with pelvic collection     Dain Bhatti  Attending Physician   Division of Infectious Disease  Pager #[phone]  Available on [employer] Teams also  After 5pm/weekend or no response, call #[phone]

## 2021-09-16 NOTE — PROGRESS NOTE ADULT - PROBLEM SELECTOR PROBLEM 1
Wound infection
Pelvic fluid collection
Wound infection
Pelvic fluid collection
Pelvic fluid collection

## 2021-09-19 DIAGNOSIS — Z71.89 OTHER SPECIFIED COUNSELING: ICD-10-CM

## 2021-09-27 PROBLEM — C68.9 MALIGNANT NEOPLASM OF URINARY ORGAN, UNSPECIFIED: Chronic | Status: ACTIVE | Noted: 2021-09-10

## 2021-10-01 ENCOUNTER — APPOINTMENT (OUTPATIENT)
Dept: CT IMAGING | Facility: HOSPITAL | Age: 81
End: 2021-10-01

## 2021-10-04 ENCOUNTER — OUTPATIENT (OUTPATIENT)
Dept: OUTPATIENT SERVICES | Facility: HOSPITAL | Age: 81
LOS: 1 days | End: 2021-10-04
Payer: MEDICARE

## 2021-10-04 DIAGNOSIS — Z11.52 ENCOUNTER FOR SCREENING FOR COVID-19: ICD-10-CM

## 2021-10-04 DIAGNOSIS — Z90.79 ACQUIRED ABSENCE OF OTHER GENITAL ORGAN(S): Chronic | ICD-10-CM

## 2021-10-04 PROCEDURE — U0003: CPT

## 2021-10-04 PROCEDURE — C9803: CPT

## 2021-10-04 PROCEDURE — U0005: CPT

## 2021-10-05 LAB — SARS-COV-2 RNA SPEC QL NAA+PROBE: SIGNIFICANT CHANGE UP

## 2021-10-07 ENCOUNTER — OUTPATIENT (OUTPATIENT)
Dept: OUTPATIENT SERVICES | Facility: HOSPITAL | Age: 81
LOS: 1 days | End: 2021-10-07
Payer: MEDICARE

## 2021-10-07 ENCOUNTER — RESULT REVIEW (OUTPATIENT)
Age: 81
End: 2021-10-07

## 2021-10-07 VITALS
RESPIRATION RATE: 18 BRPM | TEMPERATURE: 98 F | OXYGEN SATURATION: 100 % | DIASTOLIC BLOOD PRESSURE: 88 MMHG | WEIGHT: 149.03 LBS | SYSTOLIC BLOOD PRESSURE: 118 MMHG | HEART RATE: 77 BPM | HEIGHT: 60 IN

## 2021-10-07 DIAGNOSIS — Z90.79 ACQUIRED ABSENCE OF OTHER GENITAL ORGAN(S): Chronic | ICD-10-CM

## 2021-10-07 DIAGNOSIS — Z71.89 OTHER SPECIFIED COUNSELING: ICD-10-CM

## 2021-10-07 PROCEDURE — 49424 ASSESS CYST CONTRAST INJECT: CPT

## 2021-10-07 PROCEDURE — C1887: CPT

## 2021-10-07 PROCEDURE — 74176 CT ABD & PELVIS W/O CONTRAST: CPT | Mod: 26

## 2021-10-07 PROCEDURE — C1769: CPT

## 2021-10-07 PROCEDURE — 74176 CT ABD & PELVIS W/O CONTRAST: CPT

## 2021-10-07 PROCEDURE — 76080 X-RAY EXAM OF FISTULA: CPT

## 2021-10-07 PROCEDURE — 76080 X-RAY EXAM OF FISTULA: CPT | Mod: 26

## 2021-10-07 PROCEDURE — C1729: CPT

## 2021-10-07 RX ORDER — DILTIAZEM HCL 120 MG
1 CAPSULE, EXT RELEASE 24 HR ORAL
Qty: 0 | Refills: 0 | DISCHARGE

## 2021-10-07 RX ORDER — RIVAROXABAN 15 MG-20MG
1 KIT ORAL
Qty: 0 | Refills: 0 | DISCHARGE

## 2021-10-07 RX ORDER — METOPROLOL TARTRATE 50 MG
1 TABLET ORAL
Qty: 0 | Refills: 0 | DISCHARGE

## 2021-10-07 RX ORDER — ACETAMINOPHEN 500 MG
2 TABLET ORAL
Qty: 0 | Refills: 0 | DISCHARGE

## 2021-10-07 NOTE — ASU DISCHARGE PLAN (ADULT/PEDIATRIC) - ASU DC SPECIAL INSTRUCTIONSFT
Drain Check    Discharge Instructions  - You have had a drain checked.  - Keep the area clean and dry.  - Do not soak in a tub or pool with the drain, however you may shower with the drain and dressing covered in plastic wrap.  - Do not put traction on the drain and be careful that the drain does not get accidentally dislodged or kinked.  - Record output daily from the drain. Empty the bag as needed.  - You may resume your normal diet.  - You may resume your normal medications.  - It is normal to experience some pain over the site for the next few days. You may take apply ice to the area (20 minutes on, 20 minutes off) and take Tylenol for that pain. Do not take more frequently than every 6 hours and do not exceed more than 3000mg of Tylenol in a 24 hour period.    Notify your primary physician and/or Interventional Radiology IMMEDIATELY if you experience any of the following       - Fever of 100.4F  or 38C       - Chills or Rigors/ Shakes       - Swelling and/or Redness in the area of the puncture site       - Worsening Pain       - Blood soaked bandages or worsening bleeding       - Lightheadedness and/or dizziness upon standing       - Chest Pain/ Tightness       - Shortness of Breath       - Difficulty walking    If you have a problem that you believe requires IMMEDIATE attention, please go to your NEAREST Emergency Room. If you believe your problem can safely wait until you speak to a physician, please call Interventional Radiology for any concerns.    During Normal Weekday Business Hours- You can contact the Interventional Radiology department during normal business hours via telephone.  During Evenings and Weekends- If you need to contact Interventional Radiology during off hours, do so by calling the hospital and requesting to be connected to the Interventional Radiologist on call.

## 2021-10-07 NOTE — PRE PROCEDURE NOTE - PRE PROCEDURE EVALUATION
Interventional Radiology    HPI: 81y Male with hx of high grade muscle invasive urothelial cancer s/p 2 sessions immunotherapy (ipilimumab/nivolumab) c/b autoimmune hepatitis, s/p robotic cystectomy, prostatectomy with enterostomy on 7/30/2021 who presents to the ED from [facility] (Dr. Franklin Swann) for suspected wound infection. Urology consulted for Curvilinear pelvic fluid collection extending from the cystectomy bed and along the pelvic sidewalls s/p drainage of fluid collection. Patient presents to IR for pelvic drain evaluation. Pt reports <5cc output over the last 7 days.    Allergies:   Medications (Abx/Cardiac/Anticoagulation/Blood Products)      Data:  5  67.6  T(C): 36.7  HR: 77  BP: 118/88  RR: 18  SpO2: 100%    Exam  General: No acute distress  Chest: Non labored breathing  Abdomen: Non-distended  Extremities: No swelling, warm      Plan: 81y Male presents for pelvic abscess drain evaluation  -Risks/Benefits/alternatives explained with the patient and/or healthcare proxy and witnessed informed consent obtained.

## 2021-10-15 DIAGNOSIS — T85.628A DISPLACEMENT OF OTHER SPECIFIED INTERNAL PROSTHETIC DEVICES, IMPLANTS AND GRAFTS, INITIAL ENCOUNTER: ICD-10-CM

## 2021-12-01 PROCEDURE — G9005: CPT

## 2023-10-23 NOTE — ASU PREOP CHECKLIST - IV STARTED
Physical Therapy Visit    Visit Type: Daily Treatment Note  Visit: 4  Referring Provider: Margarita Bernabe MD  Medical Diagnosis (from order): Diagnosis Information    Diagnosis  M54.32 (ICD-10-CM) - Sciatica of left side         SUBJECTIVE                                                                                                                From eval: L leg pain since 9/6/23 without known incident.  Lumbar X-rays 10/3/23:  Moderate to severe disk space narrowing at L5-S1.      Pt reports taping seems to help, last Thursday was the best day regarding her pain. Pt notes her pain was 2.5-3/10 over the weekend. Increased today to 4- 5/10. Still having different sensations in her L LE esequiel her mid hamstrings, feels like it will cramp. Hardest exercises is the sciatic nerve glide.                Pain / Symptoms  - Pain rating (out of 10): Current: 4         OBJECTIVE                                                                                                                                                                         Treatment       Therapeutic Exercise  Reviewed  normalized walking pattern with equal stride, hurt not harm, sore but safe, relaxed breathing;  Reviewed self massage to medial to mid hamstrings in 90/90 position;  Reviewed lateral trunk side glide stretch to R in doorway x 10 reps, education on technique,   producing more pain in hamstring so discontinued  Lateral trunk SB stretch x 20 sec to the R  Sciatic nerve glide seated x 10-   Single KTC x 4  Sit to stands x 8, educated pt in technique; use of blanket for adductor isometricx 2 reps;   Figure 4 piriformis stretch seated x 20 sec x 3  Standing with equal WB, B shoulder ABD with inhale/exhale x5/ 2 sets beginning and end of session   - Bridge on Heels with blue tband loop x 10 - verbal review  - Hip Flexor Stretch at Edge of Bed (Mirrored)  -  Review of technique  - Seated Hip Flexor Stretch (Mirrored)  - verbal review  - Standing  Gastroc Stretch  - 3 reps - 30 hold  Ktape for L sciatic pain- medial and lateral hamstring, L4-5, L5-S1 region; educated pt in precautions and instructions for ktape;   Educated pt in relaxed breathing for sx management.   10/17/23:90/90 hamstring length: L -60, R -40    Manual Therapy   Soft tissue mobilization performed to L gluteals, piriformis region, medial hamstrings, adductors in sidelying and prone,  long axis hip distraction 30 sec x 3, manual hip flexor stretching- contract relax  LUMBAR TRACTION WITH BELT x30 sec x 5  to facilitate decreased pain and muscle overactivity and allow increased functional mobility    Skilled input: verbal instruction/cues, tactile instruction/cues, posture correction and demonstration    Writer verbally educated and received verbal consent for hand placement, positioning of patient, and techniques to be performed today from patient for clothing adjustments for techniques and hand placement and palpation for techniques as described above and how they are pertinent to the patient's plan of care.  Home Exercise Program  Date: 10/10/2023  Exercises  - Bridge on Heels  - 10 reps - 10 hold  - Hip Flexor Stretch at Edge of Bed (Mirrored)  - 3 reps - 30+ hold  - Seated Hip Flexor Stretch (Mirrored)  - 3 reps - 30 hold  - Standing Gastroc Stretch  - 3 reps - 30 hold  - Seated Gluteal Sets  - 10 reps - 10 hold    Date: 10/17/2023  Supine Piriformis Stretch Pulling Heel to Hip  - 3 reps - 30+ hold  - Supine Hip External Rotation Stretch (Mirrored)  - 3 reps - 30 hold  - Supine Sciatic Nerve Glide (Mirrored)  - 3 sets - 10 reps  - Supine Single Bent Knee Fallout  - 10 reps - up to 20 sec hold  Date: 10/19/2023  Exercises  - Lateral Shift Correction at Wall (Mirrored)  - 10 reps - 3-5 hold  Date: 10/24/23  - Sit to Stand Without Arm Support  - 10 reps        ASSESSMENT                                                                                                            Pt presents  with slowly improving L leg pain aggravated by sitting. Pt with significant L medial hamstring restrictions and neural tension along posterior chain contributing to pain, addressed with neural glides and kinesiotape today. Educated pt in lateral shift correction against wall but did not centralize pain today as it did last week so discontinued at this point.   Pt demonstrates decreased LE functional strength affecting transfers so addressed today with sit to stands. Pt challenged by sit to stands and it did mildly aggravate sx.   Patient will continue to benefit from skilled physical therapy to address deficits and progress toward goals.  Pain after session: notes she felt better at end of session.  Education:   - Results of above outlined education: Verbalizes understanding, Demonstrates understanding and Needs reinforcement    PLAN                                                                                                                           Suggestions for next session as indicated: Progress per plan of care- 4 point strengthening, manual therapy with IASTM to mid medial hamstring region, ktape trial, lumbar traction with belt         Therapy procedure time and total treatment time can be found documented on the Time Entry flowsheet     no

## 2024-09-05 ENCOUNTER — APPOINTMENT (OUTPATIENT)
Dept: SURGERY | Facility: CLINIC | Age: 84
End: 2024-09-05
Payer: MEDICARE

## 2024-09-05 VITALS
BODY MASS INDEX: 26.99 KG/M2 | WEIGHT: 162 LBS | HEIGHT: 65 IN | HEART RATE: 95 BPM | DIASTOLIC BLOOD PRESSURE: 84 MMHG | OXYGEN SATURATION: 98 % | SYSTOLIC BLOOD PRESSURE: 124 MMHG

## 2024-09-05 DIAGNOSIS — K43.5 PARASTOMAL HERNIA WITHOUT OBSTRUCTION OR GANGRENE: ICD-10-CM

## 2024-09-05 PROCEDURE — 99202 OFFICE O/P NEW SF 15 MIN: CPT

## 2024-09-05 RX ORDER — DILTIAZEM HYDROCHLORIDE 120 MG/1
120 TABLET ORAL
Refills: 0 | Status: ACTIVE | COMMUNITY

## 2024-09-05 NOTE — HISTORY OF PRESENT ILLNESS
[de-identified] : swelling in lower abdomen [de-identified] : This 85 yo M with significant PMH, including Prostate/Bladder CA and s/p Excision Bladder/Prostate and placement of Urostomy in [place] [facility]  The patient noticed development of swelling below urostomy gradually after surgery.  Patient denies pain, fever, chills, nausea, vomiting or change in bowel habits.   Patient is s/p follow up with Dr. Abdalla ([facility] [place], [place]) a few weeks ago and MRI Ab/Pelvis was performed as routine post-surgery.  * Patient takes Xarelto (h/o A. Fib and AVR) Colonoscopy 3 yrs ago, ok per patient.

## 2024-09-05 NOTE — PLAN
[FreeTextEntry1] : -Attempt to obtain copy of MRI Ab/Pelvis 08/2024 from [facility].  ** Will take 10 days to obtain report. 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 -Follow up in two weeks.

## 2024-09-05 NOTE — PHYSICAL EXAM
[Normal Breath Sounds] : Normal breath sounds [Normal Heart Sounds] : normal heart sounds [Alert] : alert [Oriented to Person] : oriented to person [Oriented to Place] : oriented to place [Oriented to Time] : oriented to time [Calm] : calm [de-identified] : well developed male in NAD [de-identified] : irreg/irreg HR (Chronic A. Fib) on Xarelto [de-identified] : Normal BS, soft, urostomy in place with mild swelling below, no tenderness or erythema [de-identified] : No palpable inguinal hernia bilateral [de-identified] : No le swelling

## 2024-09-05 NOTE — REVIEW OF SYSTEMS
[Loss Of Hearing] : hearing loss [As Noted in HPI] : as noted in HPI [Easy Bleeding] : a tendency for easy bleeding [Easy Bruising] : a tendency for easy bruising [Negative] : Endocrine [de-identified] : on Xarelto

## 2024-09-08 ENCOUNTER — EMERGENCY (EMERGENCY)
Facility: HOSPITAL | Age: 84
LOS: 1 days | Discharge: HOME | End: 2024-09-08
Attending: STUDENT IN AN ORGANIZED HEALTH CARE EDUCATION/TRAINING PROGRAM | Admitting: STUDENT IN AN ORGANIZED HEALTH CARE EDUCATION/TRAINING PROGRAM
Payer: MEDICARE

## 2024-09-08 VITALS
HEIGHT: 64 IN | OXYGEN SATURATION: 100 % | TEMPERATURE: 98 F | RESPIRATION RATE: 17 BRPM | DIASTOLIC BLOOD PRESSURE: 93 MMHG | HEART RATE: 95 BPM | WEIGHT: 162.04 LBS | SYSTOLIC BLOOD PRESSURE: 158 MMHG

## 2024-09-08 DIAGNOSIS — Z90.79 ACQUIRED ABSENCE OF OTHER GENITAL ORGAN(S): Chronic | ICD-10-CM

## 2024-09-08 PROCEDURE — 99283 EMERGENCY DEPT VISIT LOW MDM: CPT

## 2024-09-08 PROCEDURE — 99282 EMERGENCY DEPT VISIT SF MDM: CPT

## 2024-09-08 RX ORDER — OXYMETAZOLINE HYDROCHLORIDE 0.05 G/100ML
2 SPRAY, METERED NASAL ONCE
Refills: 0 | Status: DISCONTINUED | OUTPATIENT
Start: 2024-09-08 | End: 2024-09-11

## 2024-09-08 NOTE — ED PROVIDER NOTE - NSFOLLOWUPINSTRUCTIONS_ED_ALL_ED_FT
[facility] - ENT  Otolaryngology (ENT)  [address] [place] [address]  Phone: [phone]    Bimal Trevino  OTOLARYNGOLOGY  [address], [address]  [place] [address]  Phone: [phone]  Fax: [phone]  Established Patient    Nosebleed WHAT YOU NEED TO KNOW:    What do I need to know about a nosebleed? A nosebleed, or epistaxis, occurs when one or more of the blood vessels in your nose break. You may have dark or bright red blood from one or both nostrils. A nosebleed can be caused by any of the following:    Cold, dry air    Trauma from picking your nose or a direct blow to your nose    Abnormal nose structure, such as a deviated septum    Irritation or inflammation from a cold, respiratory infection, or allergies    An object stuck in your nose    Certain medicines, such as blood thinners  How is a nosebleed diagnosed?    A nasal exam may show blood clots or swelling. Your healthcare provider will use an instrument called a speculum to check the inside of your nose. This gently opens your nostrils so your healthcare provider can see what part of your nose is bleeding.    A nasal endoscopy is a deeper exam of the inside of your nose. Your healthcare provider uses a scope (thin, flexible tube with a light and camera on the end) to see further into your nose.  What first aid should I do for a nosebleed?    Sit up and lean forward. This will help prevent you from swallowing blood. Spit blood and saliva into a bowl.    Apply pressure to your nose. Use 2 fingers to pinch your nose shut for 10 to 15 minutes. This will help stop the bleeding.    Apply ice on the bridge of your nose to decrease swelling and bleeding. Use a cold pack or put crushed ice in a plastic bag. Cover it with a towel to protect your skin.    Pack your nose with a cotton ball, tissue, tampon, or gauze bandage to stop the bleeding.  How is a severe nosebleed treated? You may need any of the following if the bleeding does not stop after first aid is done:    Medicines may be applied to a small piece of cotton and placed in your nose. Medicine may also be sprayed in or applied directly to your nose. You may need medicine to prevent an infection. If bleeding is severe, medicine may be injected into a blood vessel in your nose.    Cautery is when a chemical or electric device is used to seal the blood vessels. This may be done to stop bleeding or prevent more bleeding. Local anesthesia may be used.    Nasal packing is when layers of gauze are placed in your nose to provide pressure and stop the bleeding. Local anesthesia may be used. Nasal packing is usually removed in 2 to 3 days.    Embolization is a procedure used to stop the bleeding from inside your nose. Medical glue or a small balloon device may be used to clog the blood vessels in your nose.    Surgery may be needed if your nosebleed returns over and over long-term. An artery may be tied to stop bleeding. Damaged tissue or an abnormal structure in your nose may be repaired.  How can I help prevent another nosebleed?    Keep your nose moist. Put a small amount of petroleum jelly inside your nostrils as needed. Use a saline (saltwater) nasal spray. Do not put anything else inside your nose unless your healthcare provider says it is okay. Do not use oil-based lubricants if you use oxygen therapy. They may be flammable.    Use a cool mist humidifier to increase air moisture in your home. This will help your nose stay moist.    Do not pick or blow your nose for at least a week. You can irritate or damage your nose if you pick it. Blowing your nose too hard may cause the bleeding to start again. Do not bend over or strain as this can cause the bleeding to start again.    Avoid irritants such as tobacco smoke or chemical sprays such as .  When should I seek immediate care?    Your nose is still bleeding after 20 minutes, even after you pinch it.    Your nasal packing is soaked with blood.    You have a foul-smelling discharge coming out of your nose.    You feel so weak and dizzy that you have trouble standing up.    You have trouble breathing or talking.  When should I contact my healthcare provider?    You have a fever and are vomiting.    You have pain in and around your nose.    Your nasal pack is loose.    You have questions or concerns about your condition or care.  CARE AGREEMENT:    You have the right to help plan your care. Learn about your health condition and how it may be treated. Discuss treatment options with your healthcare providers to decide what care you want to receive. You always have the right to refuse treatment.      EN ESPANOL     Translation results  [facility] - ENT  Otorrinolaringología (ENT)  [address] [place] [address]  Teléfono: [phone]    Bimal Trevino A  OTOLARINGOLOGÍA  [address], [address]  [place] [address]  Teléfono: [phone]  Fax: [phone]  Paciente establecido     Hemorragia nasal    LO QUE NECESITA SABER:    ¿Qué necesito saber acerca de cole hemorragia nasal?Cole hemorragia nasal o epistaxis ocurre cuando chetan o más de los vasos sanguíneos de evangelista nariz se revientan. Usted podría tener sangrado polanco oscuro o brillante en cole o ambas fosas nasales. Cole hemorragia nasal puede ser provocada por cualquiera de lo siguiente:    Frío, aire seco    Trauma por hurgarse evangelista nariz o un golpe directo a evangelista nariz    Estructura anormal de la nariz, anurag un tabique desviado    Irritación o inflamación debido a un resfriado, cole infección respiratoria o a alergias    Un objeto atorado en evangelista nariz    Ciertos medicamentos, anurag los anticoagulantes  ¿Cómo se diagnostica cole hemorragia nasal?    Un examen nasalpodría mostrar coágulos de lit o inflamación. Evangelista médico usará un instrumento que se conoce anurag espéculo para revisar el interior de evangelista nariz. Galva abre patrice fosas nasales cuidadosamente para que evangelista médico pueda ori qué parte de evangelista nariz está sangrando.    Cole endoscopia nasales un examen más profundo del interior de evangelista nariz. Evangelista médico usa un endoscopio (tubo monet y flexible con cole andry y cámara en el extremo) para ori más dentro de evangelista nariz.  ¿Cuáles son los primeros auxilios que debería realizar para cole hemorragia nasal?    Siéntese derecho e inclínese hacia adelante.Galva ayudará a evitar que usted se trague la lit. Escupa la lit y saliva en un contenedor.    Aplique presión en evangelista nariz.Use 2 dedos para apretar y cerrar evangelista nariz por 10 a 15 minutos. Galva ayudará a detener el sangrado.    Aplique hielosobre el wesley nasal para disminuir la inflamación y el sangrado. Use un paquete con hielo o ponga hielo triturado en cole bolsa de plástico. Cúbrala con cole toalla para proteger evangelista piel.    Tape evangelista narizcon un copo de algodón, pañuelos desechables, tampón o un vendaje de gaza para detener el sangrado.  ¿Cómo se trata cole hemorragia nasal severa?Usted podría necesitar alguno de los siguientes si la hemorragia no se detiene después de yanci realizado los primeros auxilios:    Los medicamentospodrían aplicarse a un pedazo pequeño de algodón y colocarse en evangelista nariz. Los medicamentos también podrían ser rociados o aplicados directamente en evangelista nariz. Es posible que usted necesite medicamento para evitar cole infección. Si la hemorragia es severa, se le podría inyectar el medicamento en un vaso sanguíneo de evangelista nariz.    La cauterizaciónes cuando se usa un químico o un dispositivo eléctrico para sellar los vasos sanguíneos. Galva podría realizarse para detener la hemorragia o evitar más sangrado. Podría usarse anestesia local.    El taponamiento nasales cuando se colocan capas de gaza en evangelista nariz para proporcionar presión y detener el sangrado. Podría usarse anestesia local. El taponamiento nasal usualmente se remueve dentro de 2 a 3 días.    La embolizaciónes un procedimiento que se usa para detener el sangrado del interior de evangelista nariz. Podría usarse pegamento médico o un dispositivo con balón pequeño para obstruir los vasos sanguíneos en evangelista nariz.    La cirugíapodría ser necesaria si evangelista hemorragia nasal regresa cole y otra vez por mucho tiempo. Podrían atar cole arteria para detener el sangrado. Podrían reparar el tejido dañado o cole estructura anormal en evangelista nariz.  ¿Cómo puedo ayudar a evitar otra hemorragia nasal?    Mantenga evangelista nariz húmeda.Ponga cole pequeña cantidad de cole pomada de petrolato adentro de patrice fosas nasales según sea necesario. Use cole spray nasal salino (agua con sal). No ponga nada más dentro de evangelista nariz a menos que evangelista médico lo autorice. No use un lubricante a base de aceite si está teniendo terapia de oxígeno. Podrían ser inflamables.    Éstos podrían ser inflamables.Use un humidificador de vapor frío en evangelista hogar.Galva ayudará a que evangelista nariz esté húmeda.    No se hurgue ni se suene la nariz por lo menos por cole semana.Usted puede irritar o dañar evangelista nariz si se la hurga. Al sonar o soplar la nariz muy dana podría provocar que comience a sangrar de nuevo. No se agache o se esfuerce porque esto puede provocarle que lit de nuevo.    Evite los irritantescomo el humo del cigarro o atomizadores de químicos anurag los limpiadores.  ¿Cuándo beth buscar atención inmediata?    Evangelista nariz todavía sangra después de 20 minutos, aún después de aplicarle presión.    Evangelista tapón nasal está empapado de lit.    A usted le sale cole secreción de mal olor de evangelista nariz.    Usted se siente tan débil y mareado que tiene dificultad para ponerse de pie.    Usted tiene dificultad para respirar o hablar.  ¿Cuándo beth comunicarme con mi médico?    Usted tiene fiebre y está vomitando.    Usted tiene dolor en y alrededor de la nariz.    El tapón nasal está suelto.    Usted tiene preguntas o inquietudes acerca de evangelista condición o cuidado.  ACUERDOS SOBRE EVANGELISTA CUIDADO:    Usted tiene el derecho de ayudar a planear evangelista cuidado. Aprenda todo lo que pueda sobre evangelista condición y anurag darle tratamiento. Discuta patrice opciones de tratamiento con patrice médicos para decidir el cuidado que usted desea recibir. Usted siempre tiene el derecho de rechazar el tratamiento.

## 2024-09-08 NOTE — ED ADULT TRIAGE NOTE - CHIEF COMPLAINT QUOTE
BL epistaxis that began 20 minutes ago. pt is on anticoagulants. denies dizziness, lightheadedness. history of cauterization

## 2024-09-08 NOTE — ED PROVIDER NOTE - PATIENT PORTAL LINK FT
You can access the FollowMyHealth Patient Portal offered by [facility] by registering at the following website: [email or web address]. By joining [facility]’s FollowMyHealth portal, you will also be able to view your health information using other applications (apps) compatible with our system.

## 2024-09-08 NOTE — ED ADULT NURSE NOTE - NSFALLUNIVINTERV_ED_ALL_ED
Bed/Stretcher in lowest position, wheels locked, appropriate side rails in place/Call bell, personal items and telephone in reach/Instruct patient to call for assistance before getting out of bed/chair/stretcher/Non-slip footwear applied when patient is off stretcher/[place] to call system/Physically safe environment - no spills, clutter or unnecessary equipment/Purposeful proactive rounding/Room/bathroom lighting operational, light cord in reach

## 2024-09-08 NOTE — ED PROVIDER NOTE - OBJECTIVE STATEMENT
84-year-old male on Xarelto for A-fib with a history of epistaxis presents to the ED with 1 day of epistaxis.  He said he rubbed a scab on the inside of his nose and it started to bleed.  He packed his nose with tissue and held pressure and the bleeding persisted so he presented to the ER.  He arrives with 2 pieces of gauze in both nostrils that are both soaked with blood.

## 2024-09-08 NOTE — ED ADULT NURSE NOTE - OBJECTIVE STATEMENT
Pt received in bed alert and oriented and resting in bed with the c/o b/l nose bleed. Pt seen and evaluated and treated by ER MD. Meds given tolerated well.Pt stable and d/c home. No active bleeding noted.

## 2024-09-08 NOTE — ED PROVIDER NOTE - CLINICAL SUMMARY MEDICAL DECISION MAKING FREE TEXT BOX
Patient on Xarelto presents with epistaxis.  Bleeding is currently temporized with tissue in his nostrils that the patient put in.  Will remove the tissues administer Afrin and TXA and applied direct pressure to stop the bleeding.  Will consider Rhino Rocket if above remedies do not work.

## 2024-09-08 NOTE — ED PROVIDER NOTE - PHYSICAL EXAMINATION
Constitutional: NAD AAOx3  Eyes: PERRLA EOMI  Head: Normocephalic atraumatic  Mouth: MMM,   Both nares are filled with tissue soaked with gauze.  No active bleeding.  No blood dripping in the posterior oropharynx.  Cardiac: regular rate   Resp: Lungs CTAB  GI: Abd s/nt/nd  Neuro: CN2-12 intact  Skin: No visible rashes

## 2024-10-02 ENCOUNTER — APPOINTMENT (OUTPATIENT)
Dept: ORTHOPEDIC SURGERY | Facility: CLINIC | Age: 84
End: 2024-10-02
Payer: MEDICARE

## 2024-10-02 VITALS — WEIGHT: 162 LBS | HEIGHT: 65 IN | BODY MASS INDEX: 26.99 KG/M2

## 2024-10-02 DIAGNOSIS — M79.18 MYALGIA, OTHER SITE: ICD-10-CM

## 2024-10-02 DIAGNOSIS — M47.816 SPONDYLOSIS W/OUT MYELOPATHY OR RADICULOPATHY, LUMBAR REGION: ICD-10-CM

## 2024-10-02 DIAGNOSIS — M51.360: ICD-10-CM

## 2024-10-02 DIAGNOSIS — M51.26 OTHER INTERVERTEBRAL DISC DISPLACEMENT, LUMBAR REGION: ICD-10-CM

## 2024-10-02 PROCEDURE — 99204 OFFICE O/P NEW MOD 45 MIN: CPT

## 2024-10-02 PROCEDURE — 72170 X-RAY EXAM OF PELVIS: CPT

## 2024-10-02 PROCEDURE — 72110 X-RAY EXAM L-2 SPINE 4/>VWS: CPT

## 2024-10-02 NOTE — DISCUSSION/SUMMARY
[Medication Risks Reviewed] : Medication risks reviewed [Surgical risks reviewed] : Surgical risks reviewed [de-identified] : reviewed the case and the imaging with the patient  lumbar spondylosis  discussion of the condition and treatment options cautions discussed questions answered discussion of natural history of the condition and what the next step would be PT   discussion that osteoporosis not same thing as ostoarthritis  osteoporosis tx discussed as well  could see pain management if not getting better

## 2024-10-02 NOTE — HISTORY OF PRESENT ILLNESS
[2] : 2 [0] : 0 [Tightness] : tightness [Rest] : rest [de-identified] : 10/2/24: RYAN FLOYD is a 84 year male who comes in today with lower back pain for years.  Pain has gotten more noticeable over the last year and a half. Has had x-rays in the past that his primary doctor ordered for him. Getting up from bed in the morning bothers him the most. No radiating leg pain, no n/t. Denies any specific injury/trauma. No loss of b/b control  using cane   No treatments for lower back pain. No LESI/PT/acupuncture/chirocare no medications for pain  PmHX: A.fib - takes xarelto, vertigo - using cane for this  Pshx: removal and bladder/prostate due to cancer, cholecystectomy, appendectomy  Bladder and prostate cancer in 2021 - has immune therapy No hx diabetes  DEXA scan 8/24/23 [facility]  - osteoporosis left femoral neck negative 2.6  Occupation: Overnight security 3 days per week  xrays today L-spine 4V - [id number]  AP pelvis - negativee   [] : no [de-identified] : getting out of bed in the morning [de-identified] : x-rays

## 2024-10-08 ENCOUNTER — NON-APPOINTMENT (OUTPATIENT)
Age: 84
End: 2024-10-08

## 2024-10-08 ENCOUNTER — APPOINTMENT (OUTPATIENT)
Dept: OTOLARYNGOLOGY | Facility: CLINIC | Age: 84
End: 2024-10-08
Payer: MEDICARE

## 2024-10-08 VITALS
DIASTOLIC BLOOD PRESSURE: 75 MMHG | HEART RATE: 75 BPM | HEIGHT: 65 IN | BODY MASS INDEX: 27.99 KG/M2 | WEIGHT: 168 LBS | SYSTOLIC BLOOD PRESSURE: 127 MMHG

## 2024-10-08 DIAGNOSIS — R04.0 EPISTAXIS: ICD-10-CM

## 2024-10-08 DIAGNOSIS — D68.9 COAGULATION DEFECT, UNSPECIFIED: ICD-10-CM

## 2024-10-08 DIAGNOSIS — J31.0 CHRONIC RHINITIS: ICD-10-CM

## 2024-10-08 PROCEDURE — 99203 OFFICE O/P NEW LOW 30 MIN: CPT | Mod: 25

## 2024-10-08 PROCEDURE — 31231 NASAL ENDOSCOPY DX: CPT

## 2024-10-08 RX ORDER — MUPIROCIN 20 MG/G
2 OINTMENT TOPICAL
Qty: 1 | Refills: 5 | Status: ACTIVE | COMMUNITY
Start: 2024-10-08 | End: 1900-01-01

## 2024-10-16 ENCOUNTER — APPOINTMENT (OUTPATIENT)
Dept: NEUROLOGY | Facility: CLINIC | Age: 84
End: 2024-10-16

## 2024-11-19 ENCOUNTER — APPOINTMENT (OUTPATIENT)
Dept: OTOLARYNGOLOGY | Facility: CLINIC | Age: 84
End: 2024-11-19
Payer: MEDICARE

## 2024-11-19 VITALS
HEART RATE: 67 BPM | SYSTOLIC BLOOD PRESSURE: 117 MMHG | BODY MASS INDEX: 27.16 KG/M2 | HEIGHT: 65 IN | DIASTOLIC BLOOD PRESSURE: 63 MMHG | WEIGHT: 163 LBS

## 2024-11-19 DIAGNOSIS — D68.9 COAGULATION DEFECT, UNSPECIFIED: ICD-10-CM

## 2024-11-19 DIAGNOSIS — R04.0 EPISTAXIS: ICD-10-CM

## 2024-11-19 PROCEDURE — 99213 OFFICE O/P EST LOW 20 MIN: CPT

## 2025-03-10 ENCOUNTER — APPOINTMENT (OUTPATIENT)
Dept: OTOLARYNGOLOGY | Facility: CLINIC | Age: 85
End: 2025-03-10

## 2025-03-26 ENCOUNTER — NON-APPOINTMENT (OUTPATIENT)
Age: 85
End: 2025-03-26

## 2025-03-26 ENCOUNTER — APPOINTMENT (OUTPATIENT)
Dept: OTOLARYNGOLOGY | Facility: CLINIC | Age: 85
End: 2025-03-26
Payer: MEDICARE

## 2025-03-26 VITALS
BODY MASS INDEX: 27.49 KG/M2 | WEIGHT: 165 LBS | HEART RATE: 76 BPM | SYSTOLIC BLOOD PRESSURE: 125 MMHG | DIASTOLIC BLOOD PRESSURE: 77 MMHG | HEIGHT: 65 IN

## 2025-03-26 DIAGNOSIS — D68.9 COAGULATION DEFECT, UNSPECIFIED: ICD-10-CM

## 2025-03-26 DIAGNOSIS — R04.0 EPISTAXIS: ICD-10-CM

## 2025-03-26 DIAGNOSIS — J31.0 CHRONIC RHINITIS: ICD-10-CM

## 2025-03-26 DIAGNOSIS — H91.90 UNSPECIFIED HEARING LOSS, UNSPECIFIED EAR: ICD-10-CM

## 2025-03-26 PROCEDURE — 99213 OFFICE O/P EST LOW 20 MIN: CPT

## 2025-03-26 RX ORDER — MUPIROCIN 20 MG/G
2 OINTMENT TOPICAL
Qty: 2 | Refills: 5 | Status: ACTIVE | COMMUNITY
Start: 2025-03-26 | End: 1900-01-01

## 2025-07-29 ENCOUNTER — APPOINTMENT (OUTPATIENT)
Dept: OTOLARYNGOLOGY | Facility: CLINIC | Age: 85
End: 2025-07-29
Payer: MEDICARE

## 2025-07-29 VITALS
SYSTOLIC BLOOD PRESSURE: 134 MMHG | HEIGHT: 65 IN | WEIGHT: 163 LBS | HEART RATE: 77 BPM | DIASTOLIC BLOOD PRESSURE: 73 MMHG | BODY MASS INDEX: 27.16 KG/M2

## 2025-07-29 DIAGNOSIS — D68.9 COAGULATION DEFECT, UNSPECIFIED: ICD-10-CM

## 2025-07-29 DIAGNOSIS — J31.0 CHRONIC RHINITIS: ICD-10-CM

## 2025-07-29 PROCEDURE — 99213 OFFICE O/P EST LOW 20 MIN: CPT

## 2025-07-29 RX ORDER — SODIUM CHLORIDE 0.65 %
0.65 AEROSOL, SPRAY (ML) NASAL TWICE DAILY
Qty: 2 | Refills: 4 | Status: ACTIVE | COMMUNITY
Start: 2025-07-29 | End: 1900-01-01

## 2025-07-29 RX ORDER — MUPIROCIN 20 MG/G
2 OINTMENT TOPICAL
Qty: 1 | Refills: 2 | Status: ACTIVE | COMMUNITY
Start: 2025-07-29 | End: 1900-01-01

## 2025-08-22 ENCOUNTER — APPOINTMENT (OUTPATIENT)
Dept: GASTROENTEROLOGY | Facility: CLINIC | Age: 85
End: 2025-08-22
Payer: MEDICARE

## 2025-08-22 VITALS
DIASTOLIC BLOOD PRESSURE: 78 MMHG | OXYGEN SATURATION: 99 % | BODY MASS INDEX: 27.16 KG/M2 | HEART RATE: 78 BPM | WEIGHT: 163 LBS | SYSTOLIC BLOOD PRESSURE: 128 MMHG | HEIGHT: 65 IN

## 2025-08-22 DIAGNOSIS — Z12.11 ENCOUNTER FOR SCREENING FOR MALIGNANT NEOPLASM OF COLON: ICD-10-CM

## 2025-08-22 PROCEDURE — 99203 OFFICE O/P NEW LOW 30 MIN: CPT

## 2025-08-22 RX ORDER — SODIUM SULFATE, POTASSIUM SULFATE AND MAGNESIUM SULFATE 1.6; 3.13; 17.5 G/177ML; G/177ML; G/177ML
17.5-3.13-1.6 SOLUTION ORAL
Qty: 1 | Refills: 0 | Status: ACTIVE | COMMUNITY
Start: 2025-08-22 | End: 1900-01-01

## 2025-08-27 ENCOUNTER — OUTPATIENT (OUTPATIENT)
Dept: OUTPATIENT SERVICES | Facility: HOSPITAL | Age: 85
LOS: 1 days | End: 2025-08-27

## 2025-08-27 VITALS
OXYGEN SATURATION: 98 % | WEIGHT: 162.04 LBS | RESPIRATION RATE: 16 BRPM | DIASTOLIC BLOOD PRESSURE: 72 MMHG | TEMPERATURE: 97 F | SYSTOLIC BLOOD PRESSURE: 130 MMHG | HEIGHT: 64 IN | HEART RATE: 76 BPM

## 2025-08-27 DIAGNOSIS — Z90.79 ACQUIRED ABSENCE OF OTHER GENITAL ORGAN(S): Chronic | ICD-10-CM

## 2025-08-27 DIAGNOSIS — Z12.11 ENCOUNTER FOR SCREENING FOR MALIGNANT NEOPLASM OF COLON: ICD-10-CM

## 2025-08-27 DIAGNOSIS — Z90.6 ACQUIRED ABSENCE OF OTHER PARTS OF URINARY TRACT: Chronic | ICD-10-CM

## 2025-08-27 DIAGNOSIS — I48.91 UNSPECIFIED ATRIAL FIBRILLATION: ICD-10-CM

## 2025-08-27 DIAGNOSIS — Z29.89 ENCOUNTER FOR OTHER SPECIFIED PROPHYLACTIC MEASURES: Chronic | ICD-10-CM

## 2025-08-27 DIAGNOSIS — Z95.2 PRESENCE OF PROSTHETIC HEART VALVE: Chronic | ICD-10-CM

## 2025-08-27 DIAGNOSIS — Z93.6 OTHER ARTIFICIAL OPENINGS OF URINARY TRACT STATUS: Chronic | ICD-10-CM

## 2025-08-27 DIAGNOSIS — I10 ESSENTIAL (PRIMARY) HYPERTENSION: ICD-10-CM

## 2025-08-27 RX ORDER — HYDROCHLOROTHIAZIDE 50 MG/1
1 TABLET ORAL
Refills: 0 | DISCHARGE

## 2025-08-27 RX ORDER — RIVAROXABAN 10 MG/1
1 TABLET, FILM COATED ORAL
Refills: 0 | DISCHARGE

## 2025-08-27 RX ORDER — DILTIAZEM HYDROCHLORIDE 240 MG/1
1 TABLET, EXTENDED RELEASE ORAL
Refills: 0 | DISCHARGE

## 2025-08-27 RX ORDER — METOPROLOL SUCCINATE 50 MG/1
1 TABLET, EXTENDED RELEASE ORAL
Refills: 0 | DISCHARGE

## 2025-09-09 ENCOUNTER — APPOINTMENT (OUTPATIENT)
Dept: GASTROENTEROLOGY | Facility: HOSPITAL | Age: 85
End: 2025-09-09

## 2025-09-09 ENCOUNTER — NON-APPOINTMENT (OUTPATIENT)
Age: 85
End: 2025-09-09